# Patient Record
Sex: FEMALE | Race: WHITE | Employment: OTHER | URBAN - METROPOLITAN AREA
[De-identification: names, ages, dates, MRNs, and addresses within clinical notes are randomized per-mention and may not be internally consistent; named-entity substitution may affect disease eponyms.]

---

## 2018-09-07 RX ORDER — SODIUM CHLORIDE 0.9 % (FLUSH) 0.9 %
10 SYRINGE (ML) INJECTION PRN
Status: CANCELLED | OUTPATIENT
Start: 2018-09-07

## 2018-09-07 RX ORDER — LIDOCAINE HYDROCHLORIDE 10 MG/ML
1 INJECTION, SOLUTION EPIDURAL; INFILTRATION; INTRACAUDAL; PERINEURAL
Status: CANCELLED | OUTPATIENT
Start: 2018-09-07 | End: 2018-09-07

## 2018-09-07 RX ORDER — SODIUM CHLORIDE 0.9 % (FLUSH) 0.9 %
10 SYRINGE (ML) INJECTION EVERY 12 HOURS SCHEDULED
Status: CANCELLED | OUTPATIENT
Start: 2018-09-07

## 2018-09-07 RX ORDER — SODIUM CHLORIDE 9 MG/ML
INJECTION, SOLUTION INTRAVENOUS CONTINUOUS
Status: CANCELLED | OUTPATIENT
Start: 2018-09-07

## 2018-09-09 RX ORDER — SODIUM CHLORIDE 0.9 % (FLUSH) 0.9 %
10 SYRINGE (ML) INJECTION EVERY 12 HOURS SCHEDULED
Status: CANCELLED | OUTPATIENT
Start: 2018-09-09

## 2018-09-09 RX ORDER — SODIUM CHLORIDE 0.9 % (FLUSH) 0.9 %
10 SYRINGE (ML) INJECTION PRN
Status: CANCELLED | OUTPATIENT
Start: 2018-09-09

## 2018-09-09 RX ORDER — TETRACAINE HYDROCHLORIDE 5 MG/ML
1 SOLUTION OPHTHALMIC ONCE
Status: CANCELLED | OUTPATIENT
Start: 2018-09-09 | End: 2018-09-09

## 2018-09-10 ENCOUNTER — HOSPITAL ENCOUNTER (OUTPATIENT)
Dept: SURGERY | Age: 68
Discharge: HOME | End: 2018-09-10
Attending: OPHTHALMOLOGY | Admitting: OPHTHALMOLOGY

## 2018-09-10 VITALS
WEIGHT: 147 LBS | TEMPERATURE: 98.8 F | RESPIRATION RATE: 18 BRPM | HEART RATE: 84 BPM | BODY MASS INDEX: 25.1 KG/M2 | OXYGEN SATURATION: 96 % | DIASTOLIC BLOOD PRESSURE: 67 MMHG | HEIGHT: 64 IN | SYSTOLIC BLOOD PRESSURE: 157 MMHG

## 2018-09-10 DIAGNOSIS — H25.12 AGE-RELATED NUCLEAR CATARACT OF LEFT EYE: ICD-10-CM

## 2018-09-10 RX ORDER — ONDANSETRON 2 MG/ML
4 INJECTION INTRAMUSCULAR; INTRAVENOUS
Status: ACTIVE | OUTPATIENT
Start: 2018-09-10 | End: 2018-09-10

## 2018-09-10 RX ORDER — LABETALOL HYDROCHLORIDE 5 MG/ML
5 INJECTION, SOLUTION INTRAVENOUS EVERY 10 MIN PRN
Status: DISCONTINUED | OUTPATIENT
Start: 2018-09-10 | End: 2018-09-11 | Stop reason: HOSPADM

## 2018-09-10 RX ORDER — PROMETHAZINE HYDROCHLORIDE 25 MG/ML
6.25 INJECTION, SOLUTION INTRAMUSCULAR; INTRAVENOUS
Status: ACTIVE | OUTPATIENT
Start: 2018-09-10 | End: 2018-09-10

## 2018-09-10 RX ORDER — SODIUM CHLORIDE 0.9 % (FLUSH) 0.9 %
10 SYRINGE (ML) INJECTION PRN
Status: DISCONTINUED | OUTPATIENT
Start: 2018-09-10 | End: 2018-09-11 | Stop reason: HOSPADM

## 2018-09-10 RX ORDER — TETRACAINE HYDROCHLORIDE 5 MG/ML
1 SOLUTION OPHTHALMIC SEE ADMIN INSTRUCTIONS
Status: DISCONTINUED | OUTPATIENT
Start: 2018-09-10 | End: 2018-09-11 | Stop reason: HOSPADM

## 2018-09-10 RX ORDER — CIPROFLOXACIN HYDROCHLORIDE 3.5 MG/ML
1 SOLUTION/ DROPS TOPICAL SEE ADMIN INSTRUCTIONS
Status: DISCONTINUED | OUTPATIENT
Start: 2018-09-10 | End: 2018-09-11 | Stop reason: HOSPADM

## 2018-09-10 RX ORDER — SODIUM CHLORIDE 0.9 % (FLUSH) 0.9 %
10 SYRINGE (ML) INJECTION EVERY 12 HOURS SCHEDULED
Status: DISCONTINUED | OUTPATIENT
Start: 2018-09-10 | End: 2018-09-11 | Stop reason: HOSPADM

## 2018-09-10 RX ADMIN — TETRACAINE HYDROCHLORIDE 1 DROP: 5 SOLUTION OPHTHALMIC at 09:27

## 2018-09-10 RX ADMIN — CIPROFLOXACIN HYDROCHLORIDE 1 DROP: 3.5 SOLUTION/ DROPS TOPICAL at 09:28

## 2018-09-10 ASSESSMENT — PAIN SCALES - GENERAL
PAINLEVEL_OUTOF10: 0
PAINLEVEL_OUTOF10: 0

## 2018-09-10 ASSESSMENT — PAIN - FUNCTIONAL ASSESSMENT: PAIN_FUNCTIONAL_ASSESSMENT: 0-10

## 2018-09-10 NOTE — ANESTHESIA PRE-OP
results found for: PREGTESTUR, PREGSERUM, HCG, HCGQUANT   ABGs No results found for: PHART, PO2ART, JHY0DHR, YSV3GGE, BEART, S3JKSZCN   Type & Screen (If Applicable)  No results found for: LABABO, LABRH                         BMI: Wt Readings from Last 3 Encounters:       NPO Status:8 hours                          Anesthesia Evaluation  Patient summary reviewed no history of anesthetic complications:   Airway: Mallampati: III  TM distance: >3 FB   Neck ROM: full   Dental:          Pulmonary:Negative Pulmonary ROS and normal exam                               Cardiovascular:  Exercise tolerance: good (>4 METS),           Rhythm: regular  Rate: normal           Beta Blocker:  Not on Beta Blocker         Neuro/Psych:   Negative Neuro/Psych ROS              GI/Hepatic/Renal: Neg GI/Hepatic/Renal ROS            Endo/Other:    (+) hypothyroidism::., .                 Abdominal:           Vascular: negative vascular ROS. Anesthesia Plan      MAC     ASA 2       Induction: intravenous. Anesthetic plan and risks discussed with patient. Plan discussed with CRNA. This pre-anesthesia assessment may be used as a history and physical.    DOS STAFF ADDENDUM:    Pt seen and examined, chart reviewed (including anesthesia, drug and allergy history). No interval changes to history and physical examination. Anesthetic plan, risks, benefits, alternatives, and personnel involved discussed with patient. Patient verbalized an understanding and agrees to proceed.       Yuliet Díaz MD  September 10, 2018  9:06 AM

## 2018-09-10 NOTE — ANESTHESIA POST-OP
[facility] Department of Anesthesiology  Post-Anesthesia Note       Name:  Masoud Garcia                                  Age:  77 y.o. MRN:  [id number]     Last Vitals & Oxygen Saturation: BP (!) 157/67   Pulse 84   Temp 98.8 °F (37.1 °C) (Temporal)   Resp 18   Ht 5' 4\" (1.626 m)   Wt 147 lb (66.7 kg)   SpO2 96%   BMI 25.23 kg/m²   Patient Vitals for the past 4 hrs:   BP Temp Temp src Pulse Resp SpO2 Height Weight   09/10/18 1028 (!) 157/67 - - 84 18 96 % - -   09/10/18 1026 (!) 160/77 98.8 °F (37.1 °C) Temporal 94 16 98 % - -   09/10/18 0920 - - - 78 - - - -   09/10/18 0916 (!) 140/75 97.4 °F (36.3 °C) Temporal 78 14 96 % 5' 4\" (1.626 m) 147 lb (66.7 kg)       Level of consciousness:  Awake, alert to baseline    Respiratory: Respirations easy, no distress. Stable. Cardiovascular: Hemodynamically stable. Hydration: Adequate. PONV: Adequately managed. Post-op pain: Adequately controlled. Post-op assessment: Tolerated anesthetic well without complication. Complications:  None.     Magy Jeff MD  September 10, 2018   11:26 AM

## 2018-09-21 ENCOUNTER — ANESTHESIA EVENT (OUTPATIENT)
Dept: SURGERY | Age: 68
End: 2018-09-21
Payer: MEDICARE

## 2018-09-24 ENCOUNTER — ANESTHESIA (OUTPATIENT)
Dept: SURGERY | Age: 68
End: 2018-09-24
Payer: MEDICARE

## 2018-09-24 ENCOUNTER — HOSPITAL ENCOUNTER (OUTPATIENT)
Age: 68
Setting detail: OUTPATIENT SURGERY
Discharge: HOME | End: 2018-09-24
Attending: OPHTHALMOLOGY | Admitting: OPHTHALMOLOGY
Payer: MEDICARE

## 2018-09-24 VITALS
SYSTOLIC BLOOD PRESSURE: 149 MMHG | RESPIRATION RATE: 17 BRPM | TEMPERATURE: 98.7 F | WEIGHT: 144 LBS | HEIGHT: 63 IN | BODY MASS INDEX: 25.52 KG/M2 | HEART RATE: 77 BPM | DIASTOLIC BLOOD PRESSURE: 74 MMHG | OXYGEN SATURATION: 95 %

## 2018-09-24 VITALS — OXYGEN SATURATION: 100 % | SYSTOLIC BLOOD PRESSURE: 144 MMHG | DIASTOLIC BLOOD PRESSURE: 78 MMHG

## 2018-09-24 PROCEDURE — 3600000015 HC SURGERY LEVEL 5 ADDTL 15MIN: Performed by: OPHTHALMOLOGY

## 2018-09-24 PROCEDURE — 2709999900 HC NON-CHARGEABLE SUPPLY: Performed by: OPHTHALMOLOGY

## 2018-09-24 PROCEDURE — 6370000000 HC RX 637 (ALT 250 FOR IP): Performed by: OPHTHALMOLOGY

## 2018-09-24 PROCEDURE — 2580000003 HC RX 258: Performed by: ANESTHESIOLOGY

## 2018-09-24 PROCEDURE — 6360000002 HC RX W HCPCS: Performed by: NURSE ANESTHETIST, CERTIFIED REGISTERED

## 2018-09-24 PROCEDURE — V2632 POST CHMBR INTRAOCULAR LENS: HCPCS | Performed by: OPHTHALMOLOGY

## 2018-09-24 PROCEDURE — 7100000010 HC PHASE II RECOVERY - FIRST 15 MIN: Performed by: OPHTHALMOLOGY

## 2018-09-24 PROCEDURE — 3600000005 HC SURGERY LEVEL 5 BASE: Performed by: OPHTHALMOLOGY

## 2018-09-24 PROCEDURE — 3700000001 HC ADD 15 MINUTES (ANESTHESIA): Performed by: OPHTHALMOLOGY

## 2018-09-24 PROCEDURE — 3700000000 HC ANESTHESIA ATTENDED CARE: Performed by: OPHTHALMOLOGY

## 2018-09-24 PROCEDURE — 2500000003 HC RX 250 WO HCPCS: Performed by: OPHTHALMOLOGY

## 2018-09-24 DEVICE — ACRYSOF(R) IQ ASPHERIC IOL SP ACRYLIC FOLDABLELENS WULTRASERT(TM) DELIVERY SYSTEM UV WBLUE LIGHT FILTER. 13.0MM LENGTH 6.0MM ANTERIORASYMMETRIC BICONVEX OPTIC PLANAR HAPTICS.
Type: IMPLANTABLE DEVICE | Site: EYE | Status: FUNCTIONAL
Brand: ACRYSOF ULTRASERT

## 2018-09-24 RX ORDER — SODIUM CHLORIDE 0.9 % (FLUSH) 0.9 %
10 SYRINGE (ML) INJECTION PRN
Status: DISCONTINUED | OUTPATIENT
Start: 2018-09-24 | End: 2018-09-24 | Stop reason: HOSPADM

## 2018-09-24 RX ORDER — MIDAZOLAM HYDROCHLORIDE 1 MG/ML
INJECTION INTRAMUSCULAR; INTRAVENOUS PRN
Status: DISCONTINUED | OUTPATIENT
Start: 2018-09-24 | End: 2018-09-24 | Stop reason: SDUPTHER

## 2018-09-24 RX ORDER — SODIUM CHLORIDE 0.9 % (FLUSH) 0.9 %
10 SYRINGE (ML) INJECTION EVERY 12 HOURS SCHEDULED
Status: DISCONTINUED | OUTPATIENT
Start: 2018-09-24 | End: 2018-09-24 | Stop reason: SDUPTHER

## 2018-09-24 RX ORDER — SODIUM CHLORIDE 9 MG/ML
INJECTION, SOLUTION INTRAVENOUS CONTINUOUS
Status: DISCONTINUED | OUTPATIENT
Start: 2018-09-24 | End: 2018-09-24 | Stop reason: HOSPADM

## 2018-09-24 RX ORDER — SODIUM CHLORIDE 0.9 % (FLUSH) 0.9 %
10 SYRINGE (ML) INJECTION PRN
Status: DISCONTINUED | OUTPATIENT
Start: 2018-09-24 | End: 2018-09-24 | Stop reason: SDUPTHER

## 2018-09-24 RX ORDER — CIPROFLOXACIN HYDROCHLORIDE 3.5 MG/ML
1 SOLUTION/ DROPS TOPICAL SEE ADMIN INSTRUCTIONS
Status: DISCONTINUED | OUTPATIENT
Start: 2018-09-24 | End: 2018-09-24 | Stop reason: HOSPADM

## 2018-09-24 RX ORDER — SODIUM CHLORIDE 0.9 % (FLUSH) 0.9 %
10 SYRINGE (ML) INJECTION EVERY 12 HOURS SCHEDULED
Status: DISCONTINUED | OUTPATIENT
Start: 2018-09-24 | End: 2018-09-24 | Stop reason: HOSPADM

## 2018-09-24 RX ORDER — SODIUM CHLORIDE 9 MG/ML
INJECTION, SOLUTION INTRAVENOUS CONTINUOUS
Status: DISCONTINUED | OUTPATIENT
Start: 2018-09-24 | End: 2018-09-24 | Stop reason: SDUPTHER

## 2018-09-24 RX ORDER — LIDOCAINE HYDROCHLORIDE 10 MG/ML
INJECTION, SOLUTION EPIDURAL; INFILTRATION; INTRACAUDAL; PERINEURAL
Status: COMPLETED | OUTPATIENT
Start: 2018-09-24 | End: 2018-09-24

## 2018-09-24 RX ORDER — TETRACAINE HYDROCHLORIDE 5 MG/ML
1 SOLUTION OPHTHALMIC ONCE
Status: COMPLETED | OUTPATIENT
Start: 2018-09-24 | End: 2018-09-24

## 2018-09-24 RX ORDER — BRIMONIDINE TARTRATE 0.15 %
DROPS OPHTHALMIC (EYE)
Status: COMPLETED | OUTPATIENT
Start: 2018-09-24 | End: 2018-09-24

## 2018-09-24 RX ORDER — BALANCED SALT SOLUTION 6.4; .75; .48; .3; 3.9; 1.7 MG/ML; MG/ML; MG/ML; MG/ML; MG/ML; MG/ML
SOLUTION OPHTHALMIC
Status: COMPLETED | OUTPATIENT
Start: 2018-09-24 | End: 2018-09-24

## 2018-09-24 RX ORDER — TETRACAINE HYDROCHLORIDE 5 MG/ML
SOLUTION OPHTHALMIC
Status: COMPLETED | OUTPATIENT
Start: 2018-09-24 | End: 2018-09-24

## 2018-09-24 RX ORDER — LIDOCAINE HYDROCHLORIDE 10 MG/ML
1 INJECTION, SOLUTION EPIDURAL; INFILTRATION; INTRACAUDAL; PERINEURAL
Status: DISCONTINUED | OUTPATIENT
Start: 2018-09-24 | End: 2018-09-24 | Stop reason: CLARIF

## 2018-09-24 RX ADMIN — CIPROFLOXACIN HYDROCHLORIDE 1 DROP: 3 SOLUTION/ DROPS OPHTHALMIC at 08:30

## 2018-09-24 RX ADMIN — MIDAZOLAM 2 MG: 1 INJECTION INTRAMUSCULAR; INTRAVENOUS at 09:33

## 2018-09-24 RX ADMIN — Medication 0.5 ML: at 08:37

## 2018-09-24 RX ADMIN — Medication 0.5 ML: at 08:31

## 2018-09-24 RX ADMIN — TETRACAINE HYDROCHLORIDE 1 DROP: 5 SOLUTION OPHTHALMIC at 08:37

## 2018-09-24 RX ADMIN — SODIUM CHLORIDE: 0.9 INJECTION, SOLUTION INTRAVENOUS at 08:37

## 2018-09-24 RX ADMIN — CIPROFLOXACIN HYDROCHLORIDE 1 DROP: 3 SOLUTION/ DROPS OPHTHALMIC at 08:37

## 2018-09-24 RX ADMIN — LIDOCAINE HYDROCHLORIDE: 35 GEL OPHTHALMIC at 08:37

## 2018-09-24 RX ADMIN — POVIDONE-IODINE: 5 SOLUTION OPHTHALMIC at 08:37

## 2018-09-24 ASSESSMENT — PAIN SCALES - GENERAL
PAINLEVEL_OUTOF10: 0
PAINLEVEL_OUTOF10: 0

## 2018-09-24 ASSESSMENT — PAIN - FUNCTIONAL ASSESSMENT: PAIN_FUNCTIONAL_ASSESSMENT: 0-10

## 2018-09-24 NOTE — OP NOTE
[address] [place] [address]  [phone]      9/24/2018    Patient name: Javon Rosario  YOB: 1950  MRN: [id number]    [place]iges: No Known Allergies    Pre-operative diagnosis:  Cataract Right Eye    Post-operative diagnosis:  Same    Procedure Performed:  Phacoemulsification with insertion of a foldable posterior chamber intraocular lens implant to the right eye. Anesthesia:  Topical Anesthesia with MAC. Estimated Blood Loss: None    Specimens removed: None    Limbal Relaxing Incisions:  Axis:N/A    Arc Length: N/A    Complications:  None    Description of Procedure: In the same day surgery center, the patient was given the usual pre-operative regimen. In the operating room suite, the right eye was prepped and draped in the usual sterile fashion. A paracentesis tract was fashioned, after which 0.5 MI of 1% preservative free Lidocaine was injected into the anterior chamber followed by Viscoat for a complete chamber fill. A clear cornea temporal tunnel was created using a keratome. Under optimal magnification and visualization, a continuous curvilinear capsulorrhexis was performed. Hydro-dissection of the lens was carried out using balanced salt solution. The lens was then phacoemulsified using the divide and conquer technique with a total CDE of 7.68. Residual cortex was removed using the I/A handpiece followed by thorough posterior capsule polishing. The capsular bag was then filled with Provisc and the lens was gently delivered into the bag, achieving excellent centration. Triamcinolone/Moxifloxacin was not injected 3.5mm posterior to the limbus. Provisc was removed using the I/A handpiece and the globe was pressurized using balanced salt solution. The paracentesis tract and the temporal wound were both checked and found to be watertight. The speculum was removed and Betadine 5% was instilled.  The patient tolerated the procedure

## 2018-09-24 NOTE — H&P
Date of Surgery Update:  Francis Thurman was seen, history and physical examination reviewed, and patient examined by me today.  There have been no significant clinical changes since the completion of the previous history and physical.    Electronically signed by: Antoinette Cifuentes MD,9/24/2018,9:43 AM

## 2018-09-24 NOTE — ADDENDUM NOTE
Addendum  created 09/24/18 1353 by BETINA Davila CRNA    Anesthesia Event edited, Anesthesia Intra Flowsheets edited, Anesthesia Intra Meds edited

## 2021-04-21 ENCOUNTER — APPOINTMENT (OUTPATIENT)
Dept: GENERAL RADIOLOGY | Age: 71
DRG: 149 | End: 2021-04-21
Payer: MEDICARE

## 2021-04-21 ENCOUNTER — APPOINTMENT (OUTPATIENT)
Dept: CT IMAGING | Age: 71
DRG: 149 | End: 2021-04-21
Payer: MEDICARE

## 2021-04-21 ENCOUNTER — HOSPITAL ENCOUNTER (INPATIENT)
Age: 71
LOS: 2 days | Discharge: HOME | DRG: 149 | End: 2021-04-24
Attending: EMERGENCY MEDICINE | Admitting: STUDENT IN AN ORGANIZED HEALTH CARE EDUCATION/TRAINING PROGRAM
Payer: MEDICARE

## 2021-04-21 DIAGNOSIS — R42 ACUTE SEVERE VERTIGO: Primary | ICD-10-CM

## 2021-04-21 PROCEDURE — 99282 EMERGENCY DEPT VISIT SF MDM: CPT

## 2021-04-21 PROCEDURE — 81001 URINALYSIS AUTO W/SCOPE: CPT

## 2021-04-21 PROCEDURE — 70496 CT ANGIOGRAPHY HEAD: CPT

## 2021-04-21 PROCEDURE — 93005 ELECTROCARDIOGRAM TRACING: CPT | Performed by: EMERGENCY MEDICINE

## 2021-04-21 PROCEDURE — 6360000004 HC RX CONTRAST MEDICATION: Performed by: EMERGENCY MEDICINE

## 2021-04-21 PROCEDURE — 71045 X-RAY EXAM CHEST 1 VIEW: CPT

## 2021-04-21 PROCEDURE — 70450 CT HEAD/BRAIN W/O DYE: CPT

## 2021-04-21 RX ORDER — SODIUM CHLORIDE 9 MG/ML
1000 INJECTION, SOLUTION INTRAVENOUS CONTINUOUS
Status: DISCONTINUED | OUTPATIENT
Start: 2021-04-21 | End: 2021-04-24 | Stop reason: HOSPADM

## 2021-04-21 RX ORDER — ONDANSETRON 2 MG/ML
4 INJECTION INTRAMUSCULAR; INTRAVENOUS ONCE
Status: COMPLETED | OUTPATIENT
Start: 2021-04-21 | End: 2021-04-22

## 2021-04-21 RX ADMIN — IOPAMIDOL 75 ML: 755 INJECTION, SOLUTION INTRAVENOUS at 23:28

## 2021-04-22 ENCOUNTER — APPOINTMENT (OUTPATIENT)
Dept: MRI IMAGING | Age: 71
DRG: 149 | End: 2021-04-22
Payer: MEDICARE

## 2021-04-22 PROBLEM — R42 DIZZINESS: Status: ACTIVE | Noted: 2021-04-22

## 2021-04-22 LAB
A/G RATIO: 1.4 (ref 1.1–2.2)
ALBUMIN SERPL-MCNC: 4 G/DL (ref 3.4–5)
ALP BLD-CCNC: 84 U/L (ref 40–129)
ALT SERPL-CCNC: 8 U/L (ref 10–40)
ANION GAP SERPL CALCULATED.3IONS-SCNC: 11 MMOL/L (ref 3–16)
AST SERPL-CCNC: 18 U/L (ref 15–37)
BASOPHILS ABSOLUTE: 0.1 K/UL (ref 0–0.2)
BASOPHILS RELATIVE PERCENT: 1.1 %
BILIRUB SERPL-MCNC: 0.3 MG/DL (ref 0–1)
BILIRUBIN URINE: NEGATIVE
BLOOD, URINE: NEGATIVE
BUN BLDV-MCNC: 11 MG/DL (ref 7–20)
CALCIUM SERPL-MCNC: 10 MG/DL (ref 8.3–10.6)
CHLORIDE BLD-SCNC: 101 MMOL/L (ref 99–110)
CHOLESTEROL, TOTAL: 231 MG/DL (ref 0–199)
CLARITY: ABNORMAL
CO2: 25 MMOL/L (ref 21–32)
COLOR: YELLOW
CREAT SERPL-MCNC: 0.6 MG/DL (ref 0.6–1.2)
EKG ATRIAL RATE: 73 BPM
EKG DIAGNOSIS: NORMAL
EKG P AXIS: 66 DEGREES
EKG P-R INTERVAL: 182 MS
EKG Q-T INTERVAL: 354 MS
EKG QRS DURATION: 84 MS
EKG QTC CALCULATION (BAZETT): 389 MS
EKG R AXIS: 51 DEGREES
EKG T AXIS: 38 DEGREES
EKG VENTRICULAR RATE: 73 BPM
EOSINOPHILS ABSOLUTE: 0.1 K/UL (ref 0–0.6)
EOSINOPHILS RELATIVE PERCENT: 1.8 %
EPITHELIAL CELLS, UA: 0 /HPF (ref 0–5)
GFR AFRICAN AMERICAN: >60
GFR NON-AFRICAN AMERICAN: >60
GLOBULIN: 2.9 G/DL
GLUCOSE BLD-MCNC: 121 MG/DL (ref 70–99)
GLUCOSE URINE: NEGATIVE MG/DL
HCT VFR BLD CALC: 36.3 % (ref 36–48)
HDLC SERPL-MCNC: 61 MG/DL (ref 40–60)
HEMOGLOBIN: 12.3 G/DL (ref 12–16)
HYALINE CASTS: 0 /LPF (ref 0–8)
INR BLD: 1 (ref 0.86–1.14)
KETONES, URINE: NEGATIVE MG/DL
LDL CHOLESTEROL CALCULATED: 156 MG/DL
LEUKOCYTE ESTERASE, URINE: NEGATIVE
LV EF: 58 %
LVEF MODALITY: NORMAL
LYMPHOCYTES ABSOLUTE: 2 K/UL (ref 1–5.1)
LYMPHOCYTES RELATIVE PERCENT: 28.6 %
MCH RBC QN AUTO: 31.5 PG (ref 26–34)
MCHC RBC AUTO-ENTMCNC: 33.8 G/DL (ref 31–36)
MCV RBC AUTO: 93.2 FL (ref 80–100)
MICROSCOPIC EXAMINATION: YES
MONOCYTES ABSOLUTE: 0.2 K/UL (ref 0–1.3)
MONOCYTES RELATIVE PERCENT: 2.6 %
NEUTROPHILS ABSOLUTE: 4.7 K/UL (ref 1.7–7.7)
NEUTROPHILS RELATIVE PERCENT: 65.9 %
NITRITE, URINE: NEGATIVE
PDW BLD-RTO: 13.7 % (ref 12.4–15.4)
PH UA: 8.5 (ref 5–8)
PLATELET # BLD: 285 K/UL (ref 135–450)
PMV BLD AUTO: 7.9 FL (ref 5–10.5)
POTASSIUM REFLEX MAGNESIUM: 4.1 MMOL/L (ref 3.5–5.1)
PROTEIN UA: NEGATIVE MG/DL
PROTHROMBIN TIME: 11.6 SEC (ref 10–13.2)
RBC # BLD: 3.9 M/UL (ref 4–5.2)
RBC UA: 1 /HPF (ref 0–4)
SODIUM BLD-SCNC: 137 MMOL/L (ref 136–145)
SPECIFIC GRAVITY UA: 1.03 (ref 1–1.03)
TOTAL PROTEIN: 6.9 G/DL (ref 6.4–8.2)
TRIGL SERPL-MCNC: 72 MG/DL (ref 0–150)
TROPONIN: <0.01 NG/ML
URINE REFLEX TO CULTURE: ABNORMAL
URINE TYPE: ABNORMAL
UROBILINOGEN, URINE: 0.2 E.U./DL
VLDLC SERPL CALC-MCNC: 14 MG/DL
WBC # BLD: 7.1 K/UL (ref 4–11)
WBC UA: 1 /HPF (ref 0–5)

## 2021-04-22 PROCEDURE — 70551 MRI BRAIN STEM W/O DYE: CPT

## 2021-04-22 PROCEDURE — 93306 TTE W/DOPPLER COMPLETE: CPT

## 2021-04-22 PROCEDURE — 97530 THERAPEUTIC ACTIVITIES: CPT

## 2021-04-22 PROCEDURE — 80061 LIPID PANEL: CPT

## 2021-04-22 PROCEDURE — 2580000003 HC RX 258: Performed by: EMERGENCY MEDICINE

## 2021-04-22 PROCEDURE — 6360000002 HC RX W HCPCS: Performed by: NURSE PRACTITIONER

## 2021-04-22 PROCEDURE — 84484 ASSAY OF TROPONIN QUANT: CPT

## 2021-04-22 PROCEDURE — 2580000003 HC RX 258: Performed by: NURSE PRACTITIONER

## 2021-04-22 PROCEDURE — 96374 THER/PROPH/DIAG INJ IV PUSH: CPT

## 2021-04-22 PROCEDURE — 97162 PT EVAL MOD COMPLEX 30 MIN: CPT

## 2021-04-22 PROCEDURE — 93010 ELECTROCARDIOGRAM REPORT: CPT | Performed by: INTERNAL MEDICINE

## 2021-04-22 PROCEDURE — 97165 OT EVAL LOW COMPLEX 30 MIN: CPT

## 2021-04-22 PROCEDURE — 6370000000 HC RX 637 (ALT 250 FOR IP): Performed by: NURSE PRACTITIONER

## 2021-04-22 PROCEDURE — 92610 EVALUATE SWALLOWING FUNCTION: CPT

## 2021-04-22 PROCEDURE — 80053 COMPREHEN METABOLIC PANEL: CPT

## 2021-04-22 PROCEDURE — 85610 PROTHROMBIN TIME: CPT

## 2021-04-22 PROCEDURE — 2060000000 HC ICU INTERMEDIATE R&B

## 2021-04-22 PROCEDURE — 6360000002 HC RX W HCPCS: Performed by: EMERGENCY MEDICINE

## 2021-04-22 PROCEDURE — 94760 N-INVAS EAR/PLS OXIMETRY 1: CPT

## 2021-04-22 PROCEDURE — 83036 HEMOGLOBIN GLYCOSYLATED A1C: CPT

## 2021-04-22 PROCEDURE — 85025 COMPLETE CBC W/AUTO DIFF WBC: CPT

## 2021-04-22 PROCEDURE — 36415 COLL VENOUS BLD VENIPUNCTURE: CPT

## 2021-04-22 RX ORDER — MECLIZINE HCL 12.5 MG/1
12.5 TABLET ORAL 3 TIMES DAILY PRN
Status: DISCONTINUED | OUTPATIENT
Start: 2021-04-22 | End: 2021-04-24 | Stop reason: HOSPADM

## 2021-04-22 RX ORDER — SODIUM CHLORIDE 0.9 % (FLUSH) 0.9 %
5-40 SYRINGE (ML) INJECTION EVERY 12 HOURS SCHEDULED
Status: DISCONTINUED | OUTPATIENT
Start: 2021-04-22 | End: 2021-04-24 | Stop reason: HOSPADM

## 2021-04-22 RX ORDER — DIAZEPAM 5 MG/1
5 TABLET ORAL EVERY 12 HOURS PRN
Status: DISCONTINUED | OUTPATIENT
Start: 2021-04-22 | End: 2021-04-22

## 2021-04-22 RX ORDER — OXYCODONE AND ACETAMINOPHEN 10; 325 MG/1; MG/1
1 TABLET ORAL EVERY 4 HOURS PRN
Status: DISCONTINUED | OUTPATIENT
Start: 2021-04-22 | End: 2021-04-22

## 2021-04-22 RX ORDER — CALCIUM CARBONATE 500(1250)
500 TABLET ORAL DAILY
Status: DISCONTINUED | OUTPATIENT
Start: 2021-04-22 | End: 2021-04-24 | Stop reason: HOSPADM

## 2021-04-22 RX ORDER — OXYCODONE HYDROCHLORIDE 5 MG/1
5 TABLET ORAL EVERY 8 HOURS PRN
COMMUNITY

## 2021-04-22 RX ORDER — DIAZEPAM 5 MG/1
5 TABLET ORAL ONCE
Status: COMPLETED | OUTPATIENT
Start: 2021-04-22 | End: 2021-04-22

## 2021-04-22 RX ORDER — MULTIVITAMIN WITH IRON
1 TABLET ORAL DAILY
Status: DISCONTINUED | OUTPATIENT
Start: 2021-04-22 | End: 2021-04-24 | Stop reason: HOSPADM

## 2021-04-22 RX ORDER — ROSUVASTATIN CALCIUM 40 MG/1
40 TABLET, COATED ORAL NIGHTLY
Status: DISCONTINUED | OUTPATIENT
Start: 2021-04-22 | End: 2021-04-24 | Stop reason: HOSPADM

## 2021-04-22 RX ORDER — ONDANSETRON 2 MG/ML
4 INJECTION INTRAMUSCULAR; INTRAVENOUS EVERY 6 HOURS PRN
Status: DISCONTINUED | OUTPATIENT
Start: 2021-04-22 | End: 2021-04-24 | Stop reason: HOSPADM

## 2021-04-22 RX ORDER — POLYETHYLENE GLYCOL 3350 17 G/17G
17 POWDER, FOR SOLUTION ORAL DAILY PRN
Status: DISCONTINUED | OUTPATIENT
Start: 2021-04-22 | End: 2021-04-24 | Stop reason: HOSPADM

## 2021-04-22 RX ORDER — ASPIRIN 300 MG/1
300 SUPPOSITORY RECTAL DAILY
Status: DISCONTINUED | OUTPATIENT
Start: 2021-04-22 | End: 2021-04-24 | Stop reason: HOSPADM

## 2021-04-22 RX ORDER — ASPIRIN 81 MG/1
81 TABLET ORAL DAILY
Status: DISCONTINUED | OUTPATIENT
Start: 2021-04-22 | End: 2021-04-24 | Stop reason: HOSPADM

## 2021-04-22 RX ORDER — PROMETHAZINE HYDROCHLORIDE 25 MG/1
12.5 TABLET ORAL EVERY 6 HOURS PRN
Status: DISCONTINUED | OUTPATIENT
Start: 2021-04-22 | End: 2021-04-24 | Stop reason: HOSPADM

## 2021-04-22 RX ORDER — ACETAMINOPHEN 325 MG/1
650 TABLET ORAL EVERY 6 HOURS PRN
Status: DISCONTINUED | OUTPATIENT
Start: 2021-04-22 | End: 2021-04-24 | Stop reason: HOSPADM

## 2021-04-22 RX ORDER — LEVOTHYROXINE SODIUM 112 UG/1
112 TABLET ORAL DAILY
Status: DISCONTINUED | OUTPATIENT
Start: 2021-04-22 | End: 2021-04-24 | Stop reason: HOSPADM

## 2021-04-22 RX ORDER — FOLIC ACID 1 MG/1
1 TABLET ORAL DAILY
Status: DISCONTINUED | OUTPATIENT
Start: 2021-04-22 | End: 2021-04-24 | Stop reason: HOSPADM

## 2021-04-22 RX ORDER — DIAZEPAM 5 MG/1
5 TABLET ORAL EVERY 12 HOURS PRN
Status: DISCONTINUED | OUTPATIENT
Start: 2021-04-22 | End: 2021-04-24 | Stop reason: HOSPADM

## 2021-04-22 RX ORDER — SODIUM CHLORIDE 9 MG/ML
25 INJECTION, SOLUTION INTRAVENOUS PRN
Status: DISCONTINUED | OUTPATIENT
Start: 2021-04-22 | End: 2021-04-24 | Stop reason: HOSPADM

## 2021-04-22 RX ORDER — SODIUM CHLORIDE 0.9 % (FLUSH) 0.9 %
5-40 SYRINGE (ML) INJECTION PRN
Status: DISCONTINUED | OUTPATIENT
Start: 2021-04-22 | End: 2021-04-24 | Stop reason: HOSPADM

## 2021-04-22 RX ORDER — UBIDECARENONE 75 MG
50 CAPSULE ORAL DAILY
Status: DISCONTINUED | OUTPATIENT
Start: 2021-04-22 | End: 2021-04-24 | Stop reason: HOSPADM

## 2021-04-22 RX ADMIN — ONDANSETRON 4 MG: 2 INJECTION INTRAMUSCULAR; INTRAVENOUS at 00:07

## 2021-04-22 RX ADMIN — SODIUM CHLORIDE 1000 ML: 9 INJECTION, SOLUTION INTRAVENOUS at 00:06

## 2021-04-22 RX ADMIN — ENOXAPARIN SODIUM 40 MG: 40 INJECTION SUBCUTANEOUS at 12:24

## 2021-04-22 RX ADMIN — SODIUM CHLORIDE, PRESERVATIVE FREE 10 ML: 5 INJECTION INTRAVENOUS at 20:42

## 2021-04-22 RX ADMIN — ASPIRIN 81 MG: 81 TABLET, COATED ORAL at 12:25

## 2021-04-22 RX ADMIN — CALCIUM 500 MG: 500 TABLET ORAL at 12:25

## 2021-04-22 RX ADMIN — MECLIZINE 12.5 MG: 12.5 TABLET ORAL at 03:48

## 2021-04-22 RX ADMIN — Medication 50 MCG: at 12:25

## 2021-04-22 RX ADMIN — DIAZEPAM 5 MG: 5 TABLET ORAL at 03:50

## 2021-04-22 RX ADMIN — FOLIC ACID 1 MG: 1 TABLET ORAL at 12:25

## 2021-04-22 RX ADMIN — LEVOTHYROXINE SODIUM 112 MCG: 0.11 TABLET ORAL at 06:30

## 2021-04-22 RX ADMIN — SODIUM CHLORIDE, PRESERVATIVE FREE 10 ML: 5 INJECTION INTRAVENOUS at 12:26

## 2021-04-22 RX ADMIN — ROSUVASTATIN CALCIUM 40 MG: 40 TABLET, FILM COATED ORAL at 20:41

## 2021-04-22 RX ADMIN — THERA TABS 1 TABLET: TAB at 12:25

## 2021-04-22 RX ADMIN — ACETAMINOPHEN 650 MG: 325 TABLET ORAL at 20:41

## 2021-04-22 ASSESSMENT — PAIN SCALES - GENERAL
PAINLEVEL_OUTOF10: 0

## 2021-04-22 ASSESSMENT — PAIN DESCRIPTION - ORIENTATION: ORIENTATION: OTHER (COMMENT)

## 2021-04-22 ASSESSMENT — PAIN DESCRIPTION - ONSET: ONSET: ON-GOING

## 2021-04-22 NOTE — PROGRESS NOTES
4 Eyes Skin Assessment     NAME:  Kayy Alexis  YOB: 1950  MEDICAL RECORD NUMBER:  [id number]    The patient is being assess for  Admission    I agree that 2 RN's have performed a thorough Head to Toe Skin Assessment on the patient. ALL assessment sites listed below have been assessed. Areas assessed by both nurses:    Head, Face, Ears, Shoulders, Back, Chest, Arms, Elbows, Hands, Sacrum. Buttock, Coccyx, Ischium and Legs. Feet and Heels        Does the Patient have a Wound?  No noted wound(s)       Lakhwinder Prevention initiated:  No   Wound Care Orders initiated:  No    Pressure Injury (Stage 3,4, Unstageable, DTI, NWPT, and Complex wounds) if present place consult order under [de-identified] No    New and Established Ostomies if present place consult order under : No      Nurse 1 eSignature: Electronically signed by Andrade Sprague RN on 4/22/21 at 3:38 AM EDT    **SHARE this note so that the co-signing nurse is able to place an eSignature**    Nurse 2 eSignature: Electronically signed by Diana Álvarez RN on 4/22/21 at 5:28 AM EDT

## 2021-04-22 NOTE — PROGRESS NOTES
it is recommended that the patient have 2-3 sessions per week of Physical Therapy at d/c to increase the patient's independence. At this time, this patient demonstrates the endurance and safety to discharge home with home PT level 1 and a follow up treatment frequency of 2-3x/wk. Please see assessment section for further patient specific details. If patient discharges prior to next session this note will serve as a discharge summary. Please see below for the latest assessment towards goals. Specific instructions for Next Treatment: Address vestibular dysfuction; Practice ambulation and stair climb  Prognosis: Good  Decision Making: Medium Complexity  History: See below  Exam: Strength; ROM; Balance; Ambulation; Vestibular testing  Clinical Presentation: Evolving  PT Education: Goals; General Safety;Gait Training;PT Role;Orientation;Plan of Care;Precautions;Transfer Training;Disease Specific Education;Equipment; Functional Mobility Training  Barriers to Learning: Dizziness; Vertigo; Pain  REQUIRES PT FOLLOW UP: Yes  Activity Tolerance  Activity Tolerance: Treatment limited secondary to medical complications (free text)  Activity Tolerance: Dizziness/vertigo       Patient Diagnosis(es): The encounter diagnosis was Acute severe vertigo. has a past medical history of Neck pain, Rheumatic carditis, Rheumatic fever, and Thyroid disease. has a past surgical history that includes Neck surgery; parathyroidectomy; Thyroidectomy; Rotator cuff repair (Bilateral); Appendectomy; Colonoscopy; Hysterectomy; Cataract removal with implant (Left, 09/10/2018); Cataract removal with implant (Right, 09/24/2018); and pr xcapsl ctrc rmvl insj io lens prosth w/o ecp (Right, 9/24/2018).     Restrictions  Restrictions/Precautions  Restrictions/Precautions: Fall Risk  Position Activity Restriction  Other position/activity restrictions: Dizziness/vertigo     Vision/Hearing  Vision: Impaired  Vision Exceptions: Wears glasses for reading  Hearing: (C/o intermittent ringing, and sound of \"crickets\" in her R ear.)       Subjective  General  Chart Reviewed: Yes  Patient assessed for rehabilitation services?: Yes  Additional Pertinent Hx: Per Liz Gallagher DO, Loreen Harari Danna Goldsmith is a 66 y.o. female who presents to the emergency department with a complaint of severe vertigo. The patient states that she was lying in bed and turned over today at approximately 4 PM and has had severe vertigo since that time. She states that she is not able to open her eyes. Any type of movement makes it worse. She has been nauseated but denies any vomiting. She does report a mild generalized headache. She does have chronic neck pain which is unchanged from baseline. She is having some difficulty walking and states that \"she could not hardly walk\" and required assistance of her  to get to the hospital.  She normally walks without assistance. She does not take any anticoagulants. She denies any vision loss or vision change, facial droop, difficulty speaking, focal weakness or numbness in the extremities. \"  Response To Previous Treatment: Not applicable  Referring Practitioner: BETINA Tsang CNP  Referral Date : 04/22/21  Diagnosis: Dizziness; Vertigo  Follows Commands: Within Functional Limits  Subjective  Subjective: Pt is pleasant and agreeable to evaluation. Report less dizziness than yesterday. Received medication for dizziness at some point last night.   Pain Screening  Patient Currently in Pain: Denies    Orientation  Orientation  Overall Orientation Status: Within Normal Limits     Social/Functional History  Social/Functional History  Lives With: Spouse  Type of Home: (condo)  Home Layout: One level(2nd floor condo)  Home Access: Elevator, Level entry  Bathroom Shower/Tub: Tub/Shower unit, Walk-in shower(uses walk in shower)  Bathroom Toilet: Handicap height  Bathroom Equipment: Grab bars in shower, Shower chair  Home Equipment: Rolling Strength RUE: WNL  Comment: Shoulder Flex, ABD, Elbow Flex/Ext WNL  Strength LUE  Strength LUE: WFL  Comment: Shoulder Flex, ABD grossly 4/5 (painful), Elbow Flex/Ext 4/5 (painful). Tone RLE  RLE Tone: Normotonic  Tone LLE  LLE Tone: Normotonic  Motor Control  Gross Motor?: WFL     Bed mobility  Supine to Sit: Supervision(Dizzy with movement)  Sit to Supine: Supervision(Dizzy with movement)     Transfers  Sit to Stand: Stand by assistance  Stand to sit: Stand by assistance     Ambulation  Ambulation?: Yes  Ambulation 1  Device: No Device  Assistance: Contact guard assistance;Stand by assistance  Quality of Gait: Slow speed, minimal head motion (turning L/R, or up/down), step-through pattern, mild sway, no overt LOB. Distance: 48', 200'    Plan   Plan  Times per week: 2-3x  Specific instructions for Next Treatment: Address vestibular dysfuction; Practice ambulation and stair climb  Current Treatment Recommendations: Strengthening, Balance Training, Endurance Training, Functional Mobility Training, Transfer Training, Gait Training, Stair training, Positioning, Equipment Evaluation, Education, & procurement, Patient/Caregiver Education & Training, Safety Education & Training, Home Exercise Program, Neuromuscular Re-education  Safety Devices  Type of devices: All fall risk precautions in place, Nurse notified(Care transferred to [facility] at end of session)  Restraints  Initially in place: No    AM-PAC Score  AM-PAC Inpatient Mobility Raw Score : 18 (04/22/21 0942)  AM-PAC Inpatient T-Scale Score : 43.63 (04/22/21 0942)  Mobility Inpatient CMS 0-100% Score: 46.58 (04/22/21 0942)  Mobility Inpatient CMS G-Code Modifier : CK (04/22/21 6753)          Goals  Short term goals  Time Frame for Short term goals:  In 2-3 days pt will perform  Short term goal 1: Bed mobility (I)  Short term goal 2: Transfers (I)  Short term goal 3: Ambulation 150', Mod I/(I) with LRAD  Short term goal 4: Ascend/Descend 12 steps with SBA Long term goals  Time Frame for Long term goals : LTG = STG  Patient Goals   Patient goals : To get rid of her vertigo       Therapy Time   Individual Concurrent Group Co-treatment   Time In 0850         Time Out 0935         Minutes 45         Timed Code Treatment Minutes: 30 Minutes   Evaluation time: 15 min.      Clover Baez PT    Electronically signed by Clover Baez, FRANCISCA [id number] on 4/22/2021 at 9:44 AM

## 2021-04-22 NOTE — ED NOTES
Report called to MAYRA Daugherty on 5N. Denies further questions.      Jaison Weeks RN  04/22/21 5188

## 2021-04-22 NOTE — PROGRESS NOTES
Progress Note  Admit Date: 4/21/2021      PCP: Macho Diego     CC: F/U for dizziness    Days in hospital:  0    SUBJECTIVE / Interval History:  His dizziness has improved but still has some vertigo  tired        Allergies  Patient has no known allergies. Medications    Scheduled Meds:   calcium elemental  500 mg Oral Daily    folic acid  1 mg Oral Daily    levothyroxine  112 mcg Oral Daily    multivitamin  1 tablet Oral Daily    vitamin B-12  50 mcg Oral Daily    sodium chloride flush  5-40 mL Intravenous 2 times per day    enoxaparin  40 mg Subcutaneous Daily    aspirin  81 mg Oral Daily    Or    aspirin  300 mg Rectal Daily    rosuvastatin  40 mg Oral Nightly     Continuous Infusions:   sodium chloride      sodium chloride 1,000 mL (04/22/21 0006)       PRN Meds:  sodium chloride flush, sodium chloride, promethazine **OR** ondansetron, polyethylene glycol, perflutren lipid microspheres, diazePAM, meclizine    Vitals    BP (!) 147/74   Pulse 75   Temp 97.6 °F (36.4 °C) (Oral)   Resp 16   Ht 5' 2.5\" (1.588 m)   Wt 158 lb 8.2 oz (71.9 kg)   SpO2 94%   BMI 28.53 kg/m²     Exam:    Gen: No distress. Eyes: PERRL. No sclera icterus. Mild nystagmus  ENT: No discharge. Pharynx clear. External appearance of ears and nose normal.  Neck: Trachea midline. No obvious mass. Resp: No accessory muscle use. No crackles. No wheezes. No rhonchi. No dullness on percussion. CV: Regular rate. Regular rhythm. No murmur or rub. No edema. GI: Non-tender. Non-distended. No hernia. Skin: Warm, dry, normal texture and turgor. No nodule on exposed extremities. Lymph: No cervical LAD. No supraclavicular LAD. M/S: No cyanosis. No clubbing. No joint deformity. Neuro: Moves all four extremities. CN 2-12 tested, no defect noted. Psych: Oriented x 3. No anxiety. Awake. Alert. Intact judgement and insight.     Data    LABS  CBC:   Recent Labs     04/22/21  0003   WBC 7.1   HGB 12.3   HCT 36.3   MCV 93.2 secondary to   encroachment by atherosclerotic plaque. 3. Distal right internal carotid artery cervical segment irregular contour   suggesting association with fibromuscular dysplasia (FMD). CT HEAD WO CONTRAST   Final Result   Moderate chronic microischemic disease scattered in the deep white matter   with no acute intracranial abnormality seen. The findings were called to and Dr. Paras Silva at 11:30 p.m. MRI brain without contrast    (Results Pending)       CONSULTS:    [facility] [place] TO CHANGE BASE FLUIDS  IP CONSULT TO NEUROLOGY    ASSESSMENT AND PLAN:      Active Problems:    Dizziness  Resolved Problems:    * No resolved hospital problems. *    Patient is a 75-year-old female with a past medical history of rheumatic heart disease, hypothyroidism, rheumatic fever who presented with sudden onset of dizziness and vertigo. CTA of the head and neck showed some bilateral internal carotid stenosis about 50 to 70% with left subclavian stenosis. There also was calcified left vertebral artery hence the stroke team recommended that the patient get evaluated for central causes of vertigo    Vertigo-seems more peripheral  -[place]-Hallpike positive and patient does have some nystagmus  -MRI ordered  -On aspirin statin  Neurology consulted in the ED  -Started on empiric Antivert and Valium  -PT OT ordered    Atherosclerosis bilateral carotid arteries with left subclavian stenosis  -On aspirin and statin    Chronic pain  -Resume home Percocet    Hypothyroidism  -Continue levothyroxine        Diet: Diet NPO Effective Now  Code Status: Full Code        Discharge plan -admitted overnight. Monitor today if okay can be discharged tomorrow    The patient and / or the family were informed of the results of any tests, a time was given to answer questions, a plan was proposed and they agreed with plan.     Discussed with consulting physicians, nursing and social work     The note was completed using

## 2021-04-22 NOTE — PROGRESS NOTES
Pharmacy Medication Reconciliation Note     List of medications patient is currently taking is complete. Source of information:   1. OAS  2. [address]  3. Patient interview    Notes regarding home medications:   1. Patient states not taking Amitriptylline - did not help her  2. Removed Percocet 10325  3.  Added Oxycodone 5mg     Denies taking any other OTC or herbal medications    Jose Raul Rodriguez, [place] 4/22/2021 2:25 PM

## 2021-04-22 NOTE — PROGRESS NOTES
Speech Language Pathology    Attempted to see pt for initial assessment. Currently out of room for ECHO. Will reattempt later today as schedule permits.     Leatha Partida MS, CCC-SLP #[id number]  Speech Language Pathologist

## 2021-04-22 NOTE — CONSULTS
Stroke Team Consult Note    Patient Name:  Lilly Pritchett  :  1950    Date of Encounter:  2021  Stroke Team Paged:  23:23 PM    Patient is a 65 yo F with PMHx of HTN, lumbar stenosis, and hypothyroidism (post-surgical with hx of hyperparathyroidism, Graves disease documented) presents to ED with onset of vertigo and headache. LKN 16:00 PM.    No alteplase due to LKN > 4.5 h ago. No thrombectomy due to no LVO. CT head w/o contrast with no acute intracranial hemorrhage, no hyperdense vessel, no early ischemic changes noted on my review. Calcified L vertebral artery appreciated on imaging. CTA head/neck with bilateral vertebral arteries patent, bilateral AICAs, SCAs, PCAs visualized. Some difficulty seeing origin of R PICA, L PICA visualized. Radiology report pending. A/P:  Patient does have notable atherosclerosis in vertebral artery (L > R). Would consider peripheral vertigo work up as well. Given headache with symptoms, would also get more hx regarding migraines and whether patient has a history of complicated migraine. Would not necessarily expect vestibular migraine onset at age 66 in a patient without significant migraine hx. Patients with posterior stroke can also have headache so it is certainly not ruled out though there are no options for acute intervention from a stroke standpoint in this patient.     -Given difficulty visualizing R PICA, fair to consider MRI Brain w/o contrast   -I will follow up radiology read as well  -Consider [place]-Hallpike to evaluate for possible peripheral causes of vertigo.  -Consider consult to neurology service for central vs peripheral vertigo work up if MRI Brain w/o contrast is negative for acute infarct.  -If MRI Brain is positive for acute infarct, patient should have normal stroke work up   -TTE, telemetry while inpatient and consider ZIYAD outpatient    -Lipid panel, statin for LDL > 70   -Hgb A1c, goal < 7.0%   -Continue home ASA 81 mg qd > may meet criteria for POINT protocol (DAPT x 21 days) if stroke on MRI. Please call for questions or concerns related to acute stroke management.     Mendel Pack, MD  PGY5, Vascular Neurology  Attending:  Dr. Giuseppe Pike

## 2021-04-22 NOTE — PROGRESS NOTES
NAME:  Preeti Merritt  YOB: 1950  MEDICAL RECORD NUMBER:  [id number]  TODAYS DATE:  4/22/2021    Discussed personal risk factors for Stroke /TIA with patient/family, and ways to reduce the risk for a recurrent stroke. Patient's personal risk factors which were identified are:     [] Alcohol Abuse: check with your physician before any alcohol consumption. [] Atrial fibrillation: may cause blood clots. [] Drug Abuse: Seek help, talk with your doctor  [] Clotting Disorder  [] Diabetes  [x] Family history of stroke or heart disease  [x] High Blood Pressure/Hypertension: work with your physician. [x] High cholesterol: monitor cholesterol levels with your physician.   [] Overweight/Obesity: work with your physician for your ideal body weight. [x] Physical Inactivity: get regular exercise as directed by your physician. [] Personal history of previous TIA or stroke  [x] Poor Diet; decrease salt (sodium) in your diet, follow diet directed by physician. [] Smoking: Cigarette/Cigar: stop smoking. Advised pt. that you can reduce your risk for stroke/TIA by modifying/controlling the risk factors that you have. Pt.advised to take the medications as prescribed, which will be detailed in the discharge instructions, and to not stop taking them without consulting their physician. In addition, pt. advised to maintain a healthy diet, exercise regularly and to not smoke. [facility]'s Stroke treatment and prevention, Managing your recovery  notebook  provided and/or reviewed  with patient/family. The notebook includes, but not limited to, sections addressing warning signs & symptoms of a stroke, which are: sudden numbness or weakness especially on one side of the body, sudden confusion, difficulty speaking or understanding, sudden changes in vision, sudden dizziness or loss of balance/ coordination, or sudden severe headache.   The need to call EMS ([phone]) immediately if signs & symptoms occur is emphasized . The notebook also provides education on Stroke community resources and stroke advocacy. The need for follow-up after discharge was highlighted with patient/family with them being able to repeat understanding of the importance of this.       Electronically signed by Gaetano Mccray RN on 4/22/2021 at 12:38 PM

## 2021-04-22 NOTE — PLAN OF CARE
Problem: SAFETY  Goal: Free from accidental physical injury  4/22/2021 0430 by Nona Mares RN  Outcome: Ongoing  4/22/2021 0429 by Nona Mares RN  Outcome: Ongoing  Goal: Free from intentional harm  4/22/2021 0430 by Nona Mares RN  Outcome: Ongoing  4/22/2021 0429 by Nona Mares RN  Outcome: Ongoing     Problem: DAILY CARE  Goal: Daily care needs are met  4/22/2021 0430 by Nona Mares RN  Outcome: Ongoing  4/22/2021 0429 by Nona Mares RN  Outcome: Ongoing     Problem: PAIN  Goal: Patient's pain/discomfort is manageable  4/22/2021 0430 by Nona Mares RN  Outcome: Ongoing  4/22/2021 0429 by Nona Mares RN  Outcome: Ongoing     Problem: SKIN INTEGRITY  Goal: Skin integrity is maintained or improved  4/22/2021 0430 by Nona Mares RN  Outcome: Ongoing  4/22/2021 0429 by Nona Mares RN  Outcome: Ongoing     Problem: KNOWLEDGE DEFICIT  Goal: Patient/S.O. demonstrates understanding of disease process, treatment plan, medications, and discharge instructions.   4/22/2021 0430 by Nona Mares RN  Outcome: Ongoing  4/22/2021 0429 by Nona Mares RN  Outcome: Ongoing     Problem: DISCHARGE BARRIERS  Goal: Patient's continuum of care needs are met  4/22/2021 0430 by Nona Mares RN  Outcome: Ongoing  4/22/2021 0429 by Nona Mares RN  Outcome: Ongoing     Problem: Falls - Risk of:  Goal: Will remain free from falls  Description: Will remain free from falls  4/22/2021 0430 by Nona Mares RN  Outcome: Ongoing  4/22/2021 0429 by Nona Mares RN  Outcome: Ongoing  Goal: Absence of physical injury  Description: Absence of physical injury  4/22/2021 0430 by Nona Mares RN  Outcome: Ongoing  4/22/2021 0429 by Nona Mares RN  Outcome: Ongoing     Problem: HEMODYNAMIC STATUS  Goal: Patient has stable vital signs and fluid balance  4/22/2021 0430 by Nona Mares RN  Outcome: Ongoing  4/22/2021 0429 by Nona Mares RN  Outcome: Ongoing     Problem: ACTIVITY INTOLERANCE/IMPAIRED MOBILITY  Goal: Mobility/activity is maintained at optimum level for patient  4/22/2021 0430 by Andrade Sprague, RN  Outcome: Ongoing  4/22/2021 0429 by Andrade Sprague, RN  Outcome: Ongoing     Problem: COMMUNICATION IMPAIRMENT  Goal: Ability to express needs and understand communication  4/22/2021 0430 by Andrade Sprague, RN  Outcome: Ongoing  4/22/2021 0429 by Andrade Sprague, RN  Outcome: Ongoing

## 2021-04-22 NOTE — PROGRESS NOTES
Speech Language Pathology  Facility/Department: [address] PROGRESSIVE CARE   CLINICAL BEDSIDE SWALLOW EVALUATION AND DISCHARGE SUMMARY    NAME: Lissette Zuleta  : 1950  MRN: [id number]    ADMISSION DATE: 2021  ADMITTING DIAGNOSIS: has Dizziness on their problem list.   Has a past medical history of Neck pain, Rheumatic carditis, Rheumatic fever, and Thyroid disease. ONSET DATE: 2021    Recent Chest Xray 2021  Impression   No evidence of acute cardiopulmonary disease     MRI BRAIN 2021  Impression   1. No acute intracranial abnormality.  No acute infarct. 2. Mild-to-moderate chronic microvascular ischemic changes     History of Present Illness:  Lissette Zuleta is a 66 y.o. female who presents with vertigo. Around 1600 yesterday afternoon the patient says she was laying down on her R side resting when she turned her head and immediately everything began constantly spinning around her. He says if her opened her eyes the spinning was evident. If she were to move her eyes, head, or change position she felt worse. She was nauseated though never vomited. At one point she tried to get up to go to the bathroom and she could not walk, having to crawl there. After 8+ hours of symptoms, she decided to come to the ED to be evaluated. Initial BP was 163/95. Ct head was w/out any acute findings. CTA head/neck w/out LVO. She says that she felt better after she was administered an anti-emetic shortly after midnight w/ her spinning subsiding. She feels better today, still having a bit of dizziness though no vertigo. She did fairly well w/ therapy. She says that she had a similar experience of diziness about a year ago after she put some sort of eardrop in her R ear. She has been experiencing some intermittent buzzing in her R ear for the past several months though this has never been investigated. She has had 6 neck surgeries.  She also has a hx of parathyroid surgery and Graves disease. She does note 3 episodes of head trauma last year w/out LOC    Date of Eval: 4/22/2021  Evaluating Therapist: Lili Ludwig    Current Diet level:  Current Diet : NPO  Current Liquid Diet : NPO    Primary Complaint  Patient Complaint: denies difficulty swallowing, no speech/language/cognitive change or prior difficulty. Spouse at bedside in agreement. Referral per CVA r/o protocol    Pain:  Pain Assessment  Pain Assessment: 0-10  Pain Level: 0  Patient's Stated Pain Goal: No pain    Reason for Referral  Moi Vences was referred for a bedside swallow evaluation to assess the efficiency of her swallow function, identify signs and symptoms of aspiration and make recommendations regarding safe dietary consistencies, effective compensatory strategies, and safe eating environment. Impression  Dysphagia Diagnosis: Swallow function appears grossly intact  Dysphagia Impression : Pt pleasant, cooperative, oriented x4, follows commands and able to express complex thoughts. Intact speech intelligibility. Pt motivated for PO. Oral phase grossly WFL. Functional pharyngeal phase with suspected age appropriate potential reduced laryngeal elevation. No immediate or delayed overt s/s of aspiration or difficulty. Recommend regular texture diet and thin liquids. Pt and spouse deny necessity for formal SLP/cognitive assessment at this time. MRI negative for acute process. ST will sign off; please re-consult if future needs/difficulty arise. Dysphagia Outcome Severity Scale: Level 6: Within functional limits/Modified independence     Treatment Plan  Requires SLP Intervention: No  D/C Recommendations: Home with intermittent assistance     Recommended Diet and Intervention  Diet Solids Recommendation: Regular  Liquid Consistency Recommendation:  Thin  Recommended Form of Meds: PO     Compensatory Swallowing Strategies  Compensatory Swallowing Strategies: Upright as possible for all oral

## 2021-04-22 NOTE — ED PROVIDER NOTES
[address] [place]      Pt Name: Av Nickerson  MRN: [id number]  [place] 1950  Date of evaluation: 4/21/2021  Provider: Bobby Nieves, [address] [place] [address]       Chief Complaint   Patient presents with    Dizziness     4pm history of vertigo; hasn not gone away         HISTORY OF PRESENT ILLNESS   (Location/Symptom, Timing/Onset, Context/Setting, Quality, Duration, Modifying Factors, Severity)  Note limiting factors. Av Nickerson is a 66 y.o. female who presents to the emergency department with a complaint of severe vertigo. The patient states that she was lying in bed and turned over today at approximately 4 PM and has had severe vertigo since that time. She states that she is not able to open her eyes. Any type of movement makes it worse. She has been nauseated but denies any vomiting. She does report a mild generalized headache. She does have chronic neck pain which is unchanged from baseline. She is having some difficulty walking and states that \"she could not hardly walk\" and required assistance of her  to get to the hospital.  She normally walks without assistance. She does not take any anticoagulants. She denies any vision loss or vision change, facial droop, difficulty speaking, focal weakness or numbness in the extremities. She does report a brief episode of vertigo which was very mild and lasted for a couple of hours several years ago. She denies any prior history of stroke. He denies hypertension, hyperlipidemia or diabetes. She does not smoke. She does not take any anticoagulants. She denies any recent fall trauma or injury. Nursing Notes were reviewed. HPI        REVIEW OF SYSTEMS    (2-9 systems for level 4, 10 or more for level 5)       Constitutional: Negative for fever or chills. HENT: Negative for rhinorrhea and sore throat. Eyes: Negative for redness or drainage.    Respiratory: Negative for shortness of breath or dyspnea on exertion. Cardiovascular: Negative for chest pain. Gastrointestinal: Negative for abdominal pain. Negative for vomiting or diarrhea. Genitourinary: Negative for flank pain. Negative for dysuria. Negative for hematuria. All systems are reviewed and are negative except for those listed above in the history of present illness and ROS. PAST MEDICAL HISTORY     Past Medical History:   Diagnosis Date    Neck pain     Rheumatic carditis     Rheumatic fever     Thyroid disease          SURGICAL HISTORY       Past Surgical History:   Procedure Laterality Date    APPENDECTOMY      CATARACT REMOVAL WITH IMPLANT Left 09/10/2018    Dr. Olga Rivera Right 09/24/2018    COLONOSCOPY      HYSTERECTOMY      NECK SURGERY      X 4    PARATHYROIDECTOMY      IN XCAPSL CTRC RMVL INSJ IO LENS PROSTH W/O ECP Right 9/24/2018    PHACOEMULSIFICATION WITH INTRAOCULAR LENS performed by Mayte Harris MD at [address] [facility] Bilateral     THYROIDECTOMY           CURRENT MEDICATIONS       Previous Medications    ASPIRIN 81 MG TABLET    Take 81 mg by mouth daily    CALCIUM CARBONATE (OSCAL) 500 MG TABS TABLET    Take 500 mg by mouth daily    CHOLECALCIFEROL (VITAMIN D3) 2000 UNITS CAPS    Take by mouth    FOLIC ACID (FOLVITE) 1 MG TABLET    Take 1 mg by mouth daily    L-METHYLFOLATE-ALGAE-B12-B6 (METANX PO)    Take 1 tablet by mouth daily    LEVOTHYROXINE (SYNTHROID) 112 MCG TABLET    Take 112 mcg by mouth Daily    METHOCARBAMOL (ROBAXIN) 500 MG TABLET    Take 500 mg by mouth 4 times daily    MULTIPLE VITAMIN (ONE-A-DAY ESSENTIAL) TABS    Take 1 tablet by mouth daily    OXYCODONE-ACETAMINOPHEN (PERCOCET)  MG PER TABLET    Take 1 tablet by mouth every 4 hours as needed for Pain. [place]     RANITIDINE (ZANTAC) 150 MG TABLET    Take 75 mg by mouth 2 times daily     VITAMIN B-12 (CYANOCOBALAMIN) 100 MCG TABLET    Take 50 mcg abnormality  Total: 0         PHYSICAL EXAM    (up to 7 for level 4, 8 or more for level 5)     ED Triage Vitals [04/21/21 2309]   BP Temp Temp Source Pulse Resp SpO2 Height Weight   (!) 163/95 98.2 °F (36.8 °C) Oral 89 18 97 % -- 173 lb 4.5 oz (78.6 kg)         Physical Exam   Constitutional: Awake and alert. Nauseated. Moderate discomfort. Head: No visible evidence of trauma. Normocephalic. Eyes: Pupils equal and reactive. No photophobia. Conjunctiva normal.  Mild horizontal nystagmus bilaterally with lateral gaze. HENT: Oral mucosa moist.  Airway patent. Pharynx without erythema. Nares were clear. Neck:  Soft and supple. Nontender. No point or axial tenderness. Heart:  Regular rate and rhythm. No murmur. Lungs:  Clear to auscultation. No wheezes, rales, or ronchi. No conversational dyspnea or accessory muscle use. Chest: Chest wall non-tender. No evidence of trauma. Abdomen:  Soft, nondistended, bowel sounds present. Nontender. No guarding rigidity or rebound. No masses. Musculoskeletal: Extremities non-tender with full range of motion. Radial and dorsalis pedis pulses were intact. No calf tenderness erythema or edema. Neurological: Alert and oriented x3. GCS 15. Cranial nerves II through XII were intact. No facial droop. No dysarthria. No aphasia. No pronator drift. No acute focal motor or sensory deficits. Negative finger-to-nose. Negative heel-to-shin. Deep tendon reflexes were 2/4 in the upper and lower extremities bilaterally. Gait steady. No visual field deficits. Negative test of skew. Slight horizontal nystagmus was noted bilaterally. She did have some truncal ataxia when sitting up in the bed. She felt like she was going to fall. She was unable to ambulate at the time of initial exam because of severe vertigo. Skin: Skin is warm and dry. No rash. Lymphatic:  No lympadenopathy. Psychiatric: Normal mood and affect.  Behavior is normal.         DIAGNOSTIC RESULTS     EKG: All EKG's are interpreted by the Emergency Department Physician who either signs or Co-signs this chart in the absence of a cardiologist.    Normal sinus rhythm. Rate 73. CT interval 182 ms. QRS duration 84 ms. QTc 389 ms.  R axis 51 degrees. No ST elevation. Normal EKG. RADIOLOGY:   Non-plain film images such as CT, Ultrasound and MRI are read by the radiologist. Plain radiographic images are visualized and preliminarily interpreted by the emergency physician with the below findings:        Interpretation per the Radiologist below, if available at the time of this note:    XR CHEST PORTABLE   Final Result   No evidence of acute cardiopulmonary disease         CTA HEAD NECK W CONTRAST   Final Result   1. Bilateral carotid bifurcation atherosclerotic plaque and calcification   resulting approximately 50% stenosis of the origin of the bilateral internal   carotid arteries. Finding is compatible with 50-69% stenosis per sonographic   NASCET index criteria. 2. Left subclavian artery origin approximately 60% stenosis secondary to   encroachment by atherosclerotic plaque. 3. Distal right internal carotid artery cervical segment irregular contour   suggesting association with fibromuscular dysplasia (FMD). CT HEAD WO CONTRAST   Final Result   Moderate chronic microischemic disease scattered in the deep white matter   with no acute intracranial abnormality seen. The findings were called to and Dr. Saint Heading at 11:30 p.m.                ED BEDSIDE ULTRASOUND:   Performed by ED Physician - none    LABS:  Labs Reviewed   CBC WITH AUTO DIFFERENTIAL - Abnormal; Notable for the following components:       Result Value    RBC 3.90 (*)     All other components within normal limits    Narrative:     Performed at:  [address] [address]   Phone [phone]   COMPREHENSIVE METABOLIC PANEL W/ REFLEX TO MG FOR LOW K - Abnormal; Notable for the following components:    Glucose 121 (*)     ALT 8 (*)     All other components within normal limits    Narrative:     Performed at:  [facility]  [address] [facility] [facility] [address]   Phone [phone]   URINE RT REFLEX TO CULTURE - Abnormal; Notable for the following components:    Clarity, UA CLOUDY (*)     pH, UA 8.5 (*)     All other components within normal limits    Narrative:     Performed at:  [facility]  [address] [facility] [address] [facility] [address]   Phone [phone]   TROPONIN    Narrative:     Performed at:  [address] [facility] [address]   Phone [phone]   PROTIME-INR    Narrative:     Performed at:  [facility]  [facility] [address] [facility] [address] [facility] [address]   Phone [phone]   MICROSCOPIC URINALYSIS    Narrative:     Performed at:  [address] [facility] [address]   Phone [phone]   POCT GLUCOSE       All other labs were within normal range or not returned as of this dictation. EMERGENCY DEPARTMENT COURSE and DIFFERENTIAL DIAGNOSIS/MDM:   Vitals:    Vitals:    04/21/21 2309 04/22/21 0145   BP: (!) 163/95 (!) 164/96   Pulse: 89 81   Resp: 18 12   Temp: 98.2 °F (36.8 °C)    TempSrc: Oral    SpO2: 97% 95%   Weight: 173 lb 4.5 oz (78.6 kg)          MDM      The patient presents with sudden onset of vertigo today at 4 PM as noted above. Timing initial examination she was nauseated. She was unable to keep her eyes open. She was noted to have some truncal ataxia when sitting up in bed but negative finger-to-nose and negative heel-to-shin. There were no motor or sensory deficits. NIH stroke scale was 0.   Stroke alert was activated at the time of physician exam.    11:26 PM: I spoke with Dr. Terrance Qiu from the [facility] stroke team.  She is

## 2021-04-22 NOTE — CARE COORDINATION
INITIAL CASE MANAGEMENT ASSESSMENT     Reviewed chart, spoke with patient and spouse to assess possible discharge needs. Explained Case Management role/services. Living Situation: Patient lives in a [address] with spouse;     ADLs: Independent      DME: None     PT/OT Recs:   Date of Service: 4/22/2021     Discharge Recommendations:  Patient would benefit from continued therapy after discharge, Home with assist PRN, Outpatient PT   PT Equipment Recommendations  Other: May require a RW     Continue to assess pending progress, Home with assist PRN  OT Equipment Recommendations  Equipment Needed: No     Discussed recommendation with patient. Patient agreeable to outpatient physical therapy. Patient requested order. Active Services: No active services. Transportation: Patient is an active ; spouse to transport at discharge. Medications: [facility] on [address]     PCP: Dr. Pinky Beckwith at [facility]    Patient is unable to locate his contact information at this time. HD/PD: N/A    PLAN/COMMENTS: Patient plans to return home with spouse at discharge. No anticipated needs. Patient has large family support of children and 10 grandchildren. 1)Will need Outpatient PT orders. SW/CM provided contact information for patient or family to call with any questions. SW/CM will follow and assist as needed.     TIFFANY Shultz, [place], Social Work  [phone]  Electronically signed by TIFFANY Shultz, [facility] on 4/22/2021 at 11:54 AM

## 2021-04-22 NOTE — CONSULTS
Neurology Consult Note  Reason for Consult: dizziness    Chief complaint: vertigo    Dr Michael Booker MD asked me to see Lizzie Minor in consultation for evaluation of dizziness    History of Present Illness:  Lizzie Minor is a 66 y.o. female who presents with vertigo    I obtained my information via interview w/ the patient, supplemented by chart review. Around 1600 yesterday afternoon the patient says she was laying down on her R side resting when she turned her head and immediately everything began constantly spinning around her. He says if her opened her eyes the spinning was evident. If she were to move her eyes, head, or change position she felt worse. She was nauseated though never vomited. At one point she tried to get up to go to the bathroom and she could not walk, having to crawl there. After 8+ hours of symptoms, she decided to come to the ED to be evaluated. Initial BP was 163/95. Ct head was w/out any acute findings. CTA head/neck w/out LVO. She says that she felt better after she was administered an anti-emetic shortly after midnight w/ her spinning subsiding. She feels better today, still having a bit of dizziness though no vertigo. She did fairly well w/ therapy. She says that she had a similar experience of diziness about a year ago after she put some sort of eardrop in her R ear. She has been experiencing some intermittent buzzing in her R ear for the past several months though this has never been investigated. She has had 6 neck surgeries. She also has a hx of parathyroid surgery and Graves disease. She does note 3 episodes of head trauma last year w/out LOC.       Medical History:  Past Medical History:   Diagnosis Date    Neck pain     Rheumatic carditis     Rheumatic fever     Thyroid disease      Past Surgical History:   Procedure Laterality Date    APPENDECTOMY      CATARACT REMOVAL WITH IMPLANT Left 09/10/2018    Dr. Jackie Noonan WITH IMPLANT Right 09/24/2018    COLONOSCOPY      HYSTERECTOMY      NECK SURGERY      X 4    PARATHYROIDECTOMY      [place] XCAPSL CTRC RMVL INSJ IO LENS PROSTH W/O ECP Right 9/24/2018    PHACOEMULSIFICATION WITH INTRAOCULAR LENS performed by Deric Shelley MD at [address] Bilateral     THYROIDECTOMY       Scheduled Meds:   calcium elemental  500 mg Oral Daily    folic acid  1 mg Oral Daily    levothyroxine  112 mcg Oral Daily    multivitamin  1 tablet Oral Daily    vitamin B-12  50 mcg Oral Daily    sodium chloride flush  5-40 mL Intravenous 2 times per day    enoxaparin  40 mg Subcutaneous Daily    aspirin  81 mg Oral Daily    Or    aspirin  300 mg Rectal Daily    rosuvastatin  40 mg Oral Nightly     Continuous Infusions:   sodium chloride 1,000 mL (04/22/21 0006)     Medications Prior to Admission:   oxyCODONE (ROXICODONE) 5 MG immediate release tablet, Take 5 mg by mouth every 8 hours as needed for Pain. aspirin 81 MG tablet, Take 81 mg by mouth daily  calcium carbonate (OSCAL) 500 MG TABS tablet, Take 500 mg by mouth daily  Multiple Vitamin (ONE-A-DAY ESSENTIAL) TABS, Take 1 tablet by mouth daily  Cholecalciferol (VITAMIN D3) 2000 units CAPS, Take by mouth  vitamin B-12 (CYANOCOBALAMIN) 100 MCG tablet, Take 50 mcg by mouth daily  L-Methylfolate-Algae-B12-B6 (METANX PO), Take 1 tablet by mouth daily  folic acid (FOLVITE) 1 MG tablet, Take 1 mg by mouth daily  levothyroxine (SYNTHROID) 112 MCG tablet, Take 112 mcg by mouth Daily  methocarbamol (ROBAXIN) 500 MG tablet, Take 500 mg by mouth 4 times daily  ranitidine (ZANTAC) 150 MG tablet, Take 75 mg by mouth 2 times daily   oxyCODONE-acetaminophen (PERCOCET)  MG per tablet, Take 1 tablet by mouth every 4 hours as needed for Pain. Bettey Grad No Known Allergies    History reviewed. No pertinent family history.     Social History     Tobacco Use   Smoking Status Never Smoker   Smokeless Tobacco Never Used     Social History     Substance and Sexual Activity   Drug Use No     Social History     Substance and Sexual Activity   Alcohol Use Yes    Comment: rare     ROS:  Constitutional- No weight loss or fevers  Eyes- No diplopia. No photophobia. Ears/nose/throat- No dysphagia. No Dysarthria  Cardiovascular- No palpitations. No chest pain  Respiratory- No dyspnea. No Cough  Gastrointestinal- No Abdominal pain. No Vomiting. Genitourinary- No incontinence. No urinary retention  Musculoskeletal- No myalgia. No arthralgia  Skin- No rash. No easy bruising. Psychiatric- No depression. No anxiety  Endocrine- No diabetes. No thyroid issues. Hematologic- No bleeding difficulty. No fatigue  Neurologic- No weakness. No Headache. Exam:  Blood pressure (!) 147/74, pulse 75, temperature 97.6 °F (36.4 °C), temperature source Oral, resp. rate 16, height 5' 2.5\" (1.588 m), weight 158 lb 8.2 oz (71.9 kg), SpO2 94 %. Constitutional    Vital signs: BP, HR, and RR reviewed   General alert, no distress, well-nourished  Eyes: unable to visualize the fundi  Cardiovascular: pulses symmetric in all 4 extremities. No peripheral edema. Psychiatric: cooperative with examination, no psychotic behavior noted. Neurologic  Mental status:   orientation to person, place, time, situation. General fund of knowledge grossly intact   Memory grossly intact   Attention intact as able to attend well to the exam     Language fluent in conversation   Comprehension intact; follows simple commands  Cranial nerves:   CN2: visual fields full  CN 3,4,6: extraocular muscles intact. No persistent nystagmus.     CN5: facial sensation symmetric   CN7: face symmetric without dysarthria  CN8: hearing grossly intact  CN9: palate elevated symmetrically  CN11: trap full strength on shoulder shrug  CN12: tongue midline with protrusion  Strength: good strength in all 4 extremities   Deep tendon reflexes: normal in all 4 extremities  Sensory: light touch intact in all 4 extremities. Cerebellar/coordination: finger nose finger normal without ataxia  Tone: normal in all 4 extremities  Gait: deferred at this time for safety. Labs  Glucose 121  Na 137  K 4.1  BUN 11  Cr 0.6  ALT 8  AST 18    WBC 7.1K  Hg 12.3  Platelets 575    Cholesterol, Total 231 (H)   HDL Cholesterol 61 (H)   LDL Calculated 156 (H)   Triglycerides 72   VLDL Cholesterol Calculated 14     UA negative    Studies  CT head w/o 4/21/21, independently reviewed  No acute abnormality. CTA head/neck 4/21/21, independently reviewed  1. Bilateral carotid bifurcation atherosclerotic plaque and calcification   resulting approximately 50% stenosis of the origin of the bilateral internal   carotid arteries.  Finding is compatible with 50-69% stenosis per sonographic   NASCET index criteria. 2. Left subclavian artery origin approximately 60% stenosis secondary to   encroachment by atherosclerotic plaque. 3. Distal right internal carotid artery cervical segment irregular contour   suggesting association with fibromuscular dysplasia (FMD). TTE - pending. EKG 4/21/21  NSR    Impression  1. Persistent vertigo. This is much improved though still w/ some intermittent dizziness. I did not appreciate any areas of acute stroke on brain MRI though will await official read. Posterior circulation TIA is a consideration though positive Lavera Forward would favor peripheral vestibular etiology. 2.  Moderate bilateral carotid stenosis. 3.  Possible FMD.    4.  Hyperlipidemia. 5.  Cervical spine disease. 6.  Hx rheumatic fever. Recommendations  1. Vestibular therapy may be helpful. ENT evaluation outpatient could be an option if symptoms persist.    2.  Continue antiplatelet. Agree w/ statin given atherosclerotic disease and LDL elevation. Would still recommend vascular risk factor modification. 3.  Telemetry.         Alpha Reap NP  [address] [facility] [address] Neurology    A copy of this note was provided for  Ck Mari MD

## 2021-04-22 NOTE — PROGRESS NOTES
Occupational Therapy   Occupational Therapy Initial Assessment and Tentative D/C    This note to serve as d/c summary should pt d/c prior to next session. Date: 2021   Patient Name: Marvella Lombard  MRN: [id number]     : 1950    Date of Service: 2021    Discharge Recommendations: Marvella Lombard scored a 21/24 on the [facility] ADL Inpatient form. At this time, no further OT is recommended upon discharge due to anticipate pt functioning at/close to baseline with resolved dizziness. Recommend patient returns to prior setting with prior services. Continue to assess pending progress, Home with assist PRN  OT Equipment Recommendations  Equipment Needed: No    Assessment   Performance deficits / Impairments: Decreased functional mobility ; Decreased balance;Decreased high-level IADLs;Decreased ADL status  Assessment: Marvella Lombard is a 66 y.o. female who presents to the emergency department with a complaint of severe vertigo. The patient states that she was lying in bed and turned over today at approximately 4 PM and has had severe vertigo since that time. She states that she is not able to open her eyes. Any type of movement makes it worse. She has been nauseated but denies any vomiting. She does report a mild generalized headache. She does have chronic neck pain which is unchanged from baseline. She is having some difficulty walking and states that \"she could not hardly walk\" and required assistance of her  to get to the hospital.  She normally walks without assistance. She does not take any anticoagulants. She denies any vision loss or vision change, facial droop, difficulty speaking, focal weakness or numbness in the extremities. PTA pt from home with  where pt was Ind with mobility and ADLs. Pt currently functioning slightly below baseline completing mobility and transfers with supervision/SBA and no device.  Pt reports minimal dizziness/light headedness although is  to get to the hospital.  She normally walks without assistance. She does not take any anticoagulants. She denies any vision loss or vision change, facial droop, difficulty speaking, focal weakness or numbness in the extremities. Family / Caregiver Present: No  Referring Practitioner: BETINA Altman CNP  Subjective  Subjective: Pt agreeable to OT evaluation. Pt reports dizziness/light headedness has improved but still there. Pt reports no pain. Social/Functional History  Social/Functional History  Lives With: Spouse  Type of Home: (condo)  Home Layout: One level(2nd floor condo)  Home Access: Elevator, Level entry  Bathroom Shower/Tub: Tub/Shower unit, Walk-in shower(uses walk in shower)  Bathroom Toilet: Handicap height  Bathroom Equipment: Grab bars in shower, Shower chair  Home Equipment: Rolling walker  ADL Assistance: Independent  Homemaking Assistance: Independent  Homemaking Responsibilities: Yes  Ambulation Assistance: Independent  Transfer Assistance: Independent  Active : Yes  Occupation: Retired       Objective   Vision: Impaired  Vision Exceptions: Wears glasses for reading  Hearing: Within functional limits    Orientation  Overall Orientation Status: Within Functional Limits  Orientation Level: Oriented X4     Balance  Sitting Balance: Independent  Standing Balance: Supervision  Functional Mobility  Functional - Mobility Device: No device  Activity: To/from bathroom; Other(short household distances in room; ~25ft, 40ft)  Assist Level: Stand by assistance(supervision/SBA)  Functional Mobility Comments: no overt LOB; pt reports no increase in initial dizziness/light headedness than in supine although reports still noticeable  Toilet Transfers  Toilet - Technique: Ambulating  Equipment Used: Standard toilet  Toilet Transfer: Supervision  Wheelchair Bed Transfers  Wheelchair/Bed - Technique: Ambulating  Equipment Used: Bed;Other(bed to chair)  Level of Asssistance: Supervision ADL  Additional Comments: Anticipate pt needing up to supervision for ADLs including dressing, bathing, and toileting based on ROM, strength, and balance  Tone RUE  RUE Tone: Normotonic  Tone LUE  LUE Tone: Normotonic  Coordination  Movements Are Fluid And Coordinated: Yes     Bed mobility  Supine to Sit: Independent  Sit to Supine: Unable to assess  Scooting: Independent  Transfers  Sit to stand: Supervision  Stand to sit: Supervision     Cognition  Overall Cognitive Status: WFL        Sensation  Overall Sensation Status: Impaired(reports some numbness/tingling in R LE due to history of back/neck issues)        LUE AROM (degrees)  LUE AROM : WFL  RUE AROM (degrees)  RUE AROM : WFL  LUE Strength  Gross LUE Strength: WFL  L Hand General: 5/5  RUE Strength  Gross RUE Strength: WFL  R Hand General: 5/5                   Plan   Plan  Times per week: 2-3x  Current Treatment Recommendations: Balance Training, Functional Mobility Training, Safety Education & Training, Patient/Caregiver Education & Training, Gait Training      AM-PAC Score        AM-PAC Inpatient Daily Activity Raw Score: 21 (04/22/21 0743)  AM-PAC Inpatient ADL T-Scale Score : 44.27 (04/22/21 0743)  ADL Inpatient CMS 0-100% Score: 32.79 (04/22/21 0743)  ADL Inpatient CMS G-Code Modifier : Susy Joy (04/22/21 0913)    Goals  Short term goals  Time Frame for Short term goals: prior to D/C  Short term goal 1: complete functional mobility and transfers Ind  Short term goal 2: complete toileting Ind  Short term goal 3: complete grooming in stance at sink Ind  Short term goal 4: complete LB dressing Ind  Long term goals  Time Frame for Long term goals : STG=LTG  Patient Goals   Patient goals : return home       Therapy Time   Individual Concurrent Group Co-treatment   Time In 0715         Time Out 0740         Minutes 25         Timed Code Treatment Minutes: 10 Minutes(15 minute eval)       Prisca Marmolejo, OTR/L

## 2021-04-22 NOTE — PROGRESS NOTES
Patient is admitted to room 5269 from the emergency room. Patient has arrived to the floor at this time via stretcher and ambulated/transfered to bed. Oriented to room. Call light and bedside table within reach. Denies further needs at this time. Will continue to monitor.  Helena Addison       Electronically signed by Helena Addison RN on 4/22/2021 at 3:38 AM

## 2021-04-22 NOTE — H&P
alert and oriented. Vital signs are stable. No diaphoresis. No facial drooping. No tongue fasciculation. No evidence of facial cranial nerve palsy. Shoulder shrug is symmetrical and strong, bilateral upper and bilateral lower extremity sensation intact and motor strength are strong. She has bilateral fatigable lateral nystagmus primarily to the left. Head position change and ocular movements does induce a mild return of dizziness. She describes it as a wall spinning or ceiling spinning with nausea associated. Lungs are clear. No evidence of respiratory distress. Abdomen is soft. Patient spouse is at bedside. CODE STATUS: Full  Past Medical History:        Diagnosis Date    Neck pain     Rheumatic carditis     Rheumatic fever     Thyroid disease        Past Surgical History:        Procedure Laterality Date    APPENDECTOMY      CATARACT REMOVAL WITH IMPLANT Left 09/10/2018    Dr. Rosie Pierre Right 09/24/2018    COLONOSCOPY      HYSTERECTOMY      NECK SURGERY      X 4    PARATHYROIDECTOMY      [place] XCAPSL CTRC RMVL INSJ IO LENS PROSTH W/O ECP Right 9/24/2018    PHACOEMULSIFICATION WITH INTRAOCULAR LENS performed by Yasmin Harley MD at [facility] Bilateral     THYROIDECTOMY         Medications Prior to Admission:    Prior to Admission medications    Medication Sig Start Date End Date Taking? Authorizing Provider   ranitidine (ZANTAC) 150 MG tablet Take 75 mg by mouth 2 times daily     Historical Provider, MD   aspirin 81 MG tablet Take 81 mg by mouth daily    Historical Provider, MD   methocarbamol (ROBAXIN) 500 MG tablet Take 500 mg by mouth 4 times daily    Historical Provider, MD   oxyCODONE-acetaminophen (PERCOCET)  MG per tablet Take 1 tablet by mouth every 4 hours as needed for Pain. Davide Iniguez     Historical Provider, MD   calcium carbonate (OSCAL) 500 MG TABS tablet Take 500 mg by mouth daily    Historical Provider, MD Multiple Vitamin (ONE-A-DAY ESSENTIAL) TABS Take 1 tablet by mouth daily    Historical Provider, MD   Cholecalciferol (VITAMIN D3) 2000 units CAPS Take by mouth    Historical Provider, MD   vitamin B-12 (CYANOCOBALAMIN) 100 MCG tablet Take 50 mcg by mouth daily    Historical Provider, MD   L-Methylfolate-Algae-B12-B6 (METANX PO) Take 1 tablet by mouth daily    Historical Provider, MD   folic acid (FOLVITE) 1 MG tablet Take 1 mg by mouth daily    Historical Provider, MD   levothyroxine (SYNTHROID) 112 MCG tablet Take 112 mcg by mouth Daily    Historical Provider, MD       Allergies:  Patient has no known allergies. Social History:  The patient currently lives at home with     TOBACCO:   reports that she has never smoked. She has never used smokeless tobacco.  ETOH:   reports current alcohol use. Family History:  Reviewed in detail and negative for DM, Early CAD, Cancer, CVA. Positive as follows:    History reviewed. No pertinent family history. REVIEW OF SYSTEMS:   Positive for dizziness and as noted in the HPI. All other systems reviewed and negative. PHYSICAL EXAM:    BP (!) 164/96   Pulse 81   Temp 98.2 °F (36.8 °C) (Oral)   Resp 12   Wt 173 lb 4.5 oz (78.6 kg)   SpO2 95%   BMI 30.70 kg/m²     General appearance: No apparent distress appears stated age and cooperative. HEENT: Bilateral pupils 3 to 4 mm, bilateral lateral fatigable nystagmus. Neck: Supple, No jugular venous distention/bruits. Trachea midline without thyromegaly or adenopathy   Cervical spine range of motion: Somewhat limited due to chronic pain. Lungs: Clear to auscultation, bilaterally without Rales/Wheezes/Rhonchi with good respiratory effort. Heart: Regular rate and rhythm with Normal S1/S2 without murmurs, rubs or gallops, point of maximum impulse non-displaced  Abdomen: Soft, non-tender or non-distended without rigidity or guarding and positive bowel sounds all four quadrants.   Extremities: No clubbing, cyanosis, or edema bilaterally. Full range of motion without deformity and normal gait intact. Skin: Skin color, texture, turgor normal.  No rashes or lesions. Neurologic: Alert and oriented X 3, neurovascularly intact with sensory/motor intact upper extremities/lower extremities, bilaterally. Cranial nerves: II-XII intact, no focal motor or sensory deficit. I did not ambulate this patient given mild dizziness with position change. No evidence of extremity ataxia: Finger-to-nose, heel-to-shin intact   Performs thumbs up sign, okay sign, abduction adduction x5 digits bilaterally. sensation x4 extremities  Speech is clear and articulate. No facial drooping. No lid lag. Test of skew: Negative  NIH stroke scale score 0  Mental status: Alert, oriented, thought content appropriate.   Capillary Refill: Acceptable  < 3 seconds  Peripheral Pulses: +3 Easily felt, not easily obliterated with pressure      CXR:  I have reviewed the CXR with the following interpretation: NAD  EKG:  I have reviewed the EKG with the following interpretation: Sinus rhythm: Rate 73, CO interval 182, QRS 8 4,         CBC   Recent Labs     04/22/21  0003   WBC 7.1   HGB 12.3   HCT 36.3         RENAL  Recent Labs     04/22/21  0003      K 4.1      CO2 25   BUN 11   CREATININE 0.6     LFT'S  Recent Labs     04/22/21  0003   AST 18   ALT 8*   BILITOT 0.3   ALKPHOS 84     COAG  Recent Labs     04/22/21  0003   INR 1.00     CARDIAC ENZYMES  Recent Labs     04/22/21 0003   TROPONINI <0.01       U/A:    Lab Results   Component Value Date    COLORU YELLOW 04/21/2021    WBCUA 1 04/21/2021    RBCUA 1 04/21/2021    CLARITYU CLOUDY 04/21/2021    SPECGRAV 1.026 04/21/2021    LEUKOCYTESUR Negative 04/21/2021    BLOODU Negative 04/21/2021    GLUCOSEU Negative 04/21/2021       ABG  No results found for: IOH4WUK, BEART, U2GOSAMT, PHART, THGBART, JJJ8LET, PO2ART, QQC8DBS        Active Hospital Problems    Diagnosis Date Noted    Dizziness [R42] 04/22/2021         PHYSICIANS CERTIFICATION:    I certify that Lilly Pritchett is expected to be hospitalized for more than 2 midnights based on the following assessment and plan:      ASSESSMENT/PLAN:    Dizziness/vertigo: Acute onset with rolling over, worsens with movement  DDx: BPPV, posterior circulation CVA, vestibular nerve dysfunction, neuroma  Continue aspirin and statin  Echo  MRI of the brain without contrast  Neurology consult  Oral Valium twice daily as needed and Antivert 3 times daily as needed for symptomatic treatment  PT/OT/speech therapy consult  NIH SS/neuro checks    Atherosclerosis bilateral carotid arteries: As evidenced on CTA head and neck: 50 to 69%  Left subclavian stenosis: 60%: As evidenced on CTA neck    Chronic neck pain: Percocet 10 mg as needed home regimen  Held at this time due to dizziness/vertigo symptomatic treatment    Hypothyroid: Continue levothyroxine      DVT Prophylaxis: Lovenox  Diet: Diet NPO Effective Now  Code Status: Prior  PT/OT Eval Status: Consulted    Dispo - admit, inpt       Gunnar Ruff, APRN - CNP    Thank you Chapito Aguilar for the opportunity to be involved in this patient's care. If you have any questions or concerns please feel free to contact me at [phone].

## 2021-04-23 LAB
ESTIMATED AVERAGE GLUCOSE: 131.2 MG/DL
HBA1C MFR BLD: 6.2 %
HCT VFR BLD CALC: 37.6 % (ref 36–48)
HEMOGLOBIN: 12.4 G/DL (ref 12–16)
MCH RBC QN AUTO: 31.1 PG (ref 26–34)
MCHC RBC AUTO-ENTMCNC: 32.9 G/DL (ref 31–36)
MCV RBC AUTO: 94.4 FL (ref 80–100)
PDW BLD-RTO: 14.1 % (ref 12.4–15.4)
PLATELET # BLD: 274 K/UL (ref 135–450)
PMV BLD AUTO: 7.9 FL (ref 5–10.5)
RBC # BLD: 3.98 M/UL (ref 4–5.2)
WBC # BLD: 6.5 K/UL (ref 4–11)

## 2021-04-23 PROCEDURE — 97530 THERAPEUTIC ACTIVITIES: CPT

## 2021-04-23 PROCEDURE — 85027 COMPLETE CBC AUTOMATED: CPT

## 2021-04-23 PROCEDURE — 6370000000 HC RX 637 (ALT 250 FOR IP): Performed by: NURSE PRACTITIONER

## 2021-04-23 PROCEDURE — 36415 COLL VENOUS BLD VENIPUNCTURE: CPT

## 2021-04-23 PROCEDURE — 6370000000 HC RX 637 (ALT 250 FOR IP): Performed by: INTERNAL MEDICINE

## 2021-04-23 PROCEDURE — 2060000000 HC ICU INTERMEDIATE R&B

## 2021-04-23 PROCEDURE — 83036 HEMOGLOBIN GLYCOSYLATED A1C: CPT

## 2021-04-23 PROCEDURE — 2580000003 HC RX 258: Performed by: NURSE PRACTITIONER

## 2021-04-23 PROCEDURE — 94760 N-INVAS EAR/PLS OXIMETRY 1: CPT

## 2021-04-23 PROCEDURE — 6360000002 HC RX W HCPCS: Performed by: NURSE PRACTITIONER

## 2021-04-23 PROCEDURE — 87449 NOS EACH ORGANISM AG IA: CPT

## 2021-04-23 RX ORDER — DEXAMETHASONE SODIUM PHOSPHATE 1 MG/ML
4 SOLUTION/ DROPS OPHTHALMIC 2 TIMES DAILY
Status: DISCONTINUED | OUTPATIENT
Start: 2021-04-23 | End: 2021-04-24 | Stop reason: HOSPADM

## 2021-04-23 RX ORDER — ACYCLOVIR 200 MG/1
400 CAPSULE ORAL 4 TIMES DAILY
Status: DISCONTINUED | OUTPATIENT
Start: 2021-04-23 | End: 2021-04-24 | Stop reason: HOSPADM

## 2021-04-23 RX ORDER — CIPROFLOXACIN HYDROCHLORIDE 3.5 MG/ML
4 SOLUTION/ DROPS TOPICAL 2 TIMES DAILY
Status: DISCONTINUED | OUTPATIENT
Start: 2021-04-23 | End: 2021-04-24 | Stop reason: HOSPADM

## 2021-04-23 RX ORDER — CIPROFLOXACIN AND DEXAMETHASONE 3; 1 MG/ML; MG/ML
4 SUSPENSION/ DROPS AURICULAR (OTIC) 2 TIMES DAILY
Status: DISCONTINUED | OUTPATIENT
Start: 2021-04-23 | End: 2021-04-23

## 2021-04-23 RX ORDER — PREDNISONE 20 MG/1
40 TABLET ORAL DAILY
Status: DISCONTINUED | OUTPATIENT
Start: 2021-04-23 | End: 2021-04-24 | Stop reason: HOSPADM

## 2021-04-23 RX ADMIN — Medication 50 MCG: at 08:54

## 2021-04-23 RX ADMIN — ROSUVASTATIN CALCIUM 40 MG: 40 TABLET, FILM COATED ORAL at 20:20

## 2021-04-23 RX ADMIN — DEXAMETHASONE SODIUM PHOSPHATE 4 DROP: 1 SOLUTION/ DROPS OPHTHALMIC at 20:21

## 2021-04-23 RX ADMIN — THERA TABS 1 TABLET: TAB at 08:48

## 2021-04-23 RX ADMIN — CARBAMIDE PEROXIDE 6.5% 5 DROP: 6.5 LIQUID AURICULAR (OTIC) at 14:02

## 2021-04-23 RX ADMIN — ASPIRIN 81 MG: 81 TABLET, COATED ORAL at 08:47

## 2021-04-23 RX ADMIN — SODIUM CHLORIDE, PRESERVATIVE FREE 10 ML: 5 INJECTION INTRAVENOUS at 20:20

## 2021-04-23 RX ADMIN — CIPROFLOXACIN 4 DROP: 3 SOLUTION OPHTHALMIC at 14:52

## 2021-04-23 RX ADMIN — ACYCLOVIR 400 MG: 200 CAPSULE ORAL at 14:00

## 2021-04-23 RX ADMIN — LEVOTHYROXINE SODIUM 112 MCG: 0.11 TABLET ORAL at 06:40

## 2021-04-23 RX ADMIN — CARBAMIDE PEROXIDE 6.5% 5 DROP: 6.5 LIQUID AURICULAR (OTIC) at 17:34

## 2021-04-23 RX ADMIN — PREDNISONE 40 MG: 20 TABLET ORAL at 14:00

## 2021-04-23 RX ADMIN — CALCIUM 500 MG: 500 TABLET ORAL at 08:47

## 2021-04-23 RX ADMIN — SODIUM CHLORIDE, PRESERVATIVE FREE 10 ML: 5 INJECTION INTRAVENOUS at 08:47

## 2021-04-23 RX ADMIN — DEXAMETHASONE SODIUM PHOSPHATE 4 DROP: 1 SOLUTION/ DROPS OPHTHALMIC at 15:23

## 2021-04-23 RX ADMIN — FOLIC ACID 1 MG: 1 TABLET ORAL at 08:48

## 2021-04-23 RX ADMIN — ACETAMINOPHEN 650 MG: 325 TABLET ORAL at 20:20

## 2021-04-23 RX ADMIN — ENOXAPARIN SODIUM 40 MG: 40 INJECTION SUBCUTANEOUS at 08:47

## 2021-04-23 RX ADMIN — ACYCLOVIR 400 MG: 200 CAPSULE ORAL at 20:21

## 2021-04-23 RX ADMIN — ACYCLOVIR 400 MG: 200 CAPSULE ORAL at 17:33

## 2021-04-23 RX ADMIN — CIPROFLOXACIN 4 DROP: 3 SOLUTION OPHTHALMIC at 20:21

## 2021-04-23 ASSESSMENT — PAIN DESCRIPTION - ORIENTATION: ORIENTATION: UPPER

## 2021-04-23 ASSESSMENT — PAIN DESCRIPTION - LOCATION
LOCATION: HEAD
LOCATION: HEAD

## 2021-04-23 ASSESSMENT — PAIN SCALES - GENERAL
PAINLEVEL_OUTOF10: 7
PAINLEVEL_OUTOF10: 0

## 2021-04-23 ASSESSMENT — PAIN DESCRIPTION - ONSET
ONSET: ON-GOING
ONSET: ON-GOING

## 2021-04-23 ASSESSMENT — PAIN DESCRIPTION - DESCRIPTORS
DESCRIPTORS: ACHING
DESCRIPTORS: ACHING;CONSTANT

## 2021-04-23 ASSESSMENT — PAIN DESCRIPTION - PAIN TYPE: TYPE: ACUTE PAIN

## 2021-04-23 NOTE — PROGRESS NOTES
Occupational Therapy  [facility] PRO[facility]  Daily Treatment Note and Tentative D/C    This note to serve as d/c summary should pt d/c prior to next session. NAME: Grzegorz Cabello  : 1950  MRN: [id number]    Date of Service: 2021    Discharge Recommendations: Grzegorz Cabello scored a 21/24 on the AM-PAC ADL Inpatient form. At this time, no further OT is recommended upon discharge due to anticipate pt functioning at/close to baseline at time of D/C. Recommend patient returns to prior setting with prior services. Continue to assess pending progress, Home with assist PRN  OT Equipment Recommendations  Equipment Needed: No    Assessment   Performance deficits / Impairments: Decreased functional mobility ; Decreased balance;Decreased high-level IADLs;Decreased ADL status  Assessment: Pt tolerated session well. Pt completes functional mobility and transfers with SBA ~75ft with no overt LOB and no device. Pt reports she continues to have some noted light headedness/dizziness. Pt educated on use of RW at home as needed. Pt verbalized understanding. Pt reports having PRN assist from family upon D/C. Continue with POC. Prognosis: Good  OT Education: OT Role;Plan of Care;Transfer Training  REQUIRES OT FOLLOW UP: Yes  Activity Tolerance  Activity Tolerance: Patient Tolerated treatment well  Safety Devices  Safety Devices in place: Yes  Type of devices: Left in chair;Call light within reach;Nurse notified; Chair alarm in place         Patient Diagnosis(es): The encounter diagnosis was Acute severe vertigo. has a past medical history of Neck pain, Rheumatic carditis, Rheumatic fever, and Thyroid disease. has a past surgical history that includes Neck surgery; parathyroidectomy; Thyroidectomy; Rotator cuff repair (Bilateral); Appendectomy; Colonoscopy; Hysterectomy; Cataract removal with implant (Left, 09/10/2018);  Cataract removal with implant (Right, 2018); and pr xcapsl ctrc rmvl insj io lens prosth w/o ecp (Right, 9/24/2018). Restrictions  Restrictions/Precautions  Restrictions/Precautions: Fall Risk  Position Activity Restriction  Other position/activity restrictions: Dizziness/vertigo  Subjective   General  Chart Reviewed: Yes  Patient assessed for rehabilitation services?: Yes  Additional Pertinent Hx: per ED note, Nanette Butterfield is a 66 y.o. female who presents to the emergency department with a complaint of severe vertigo. The patient states that she was lying in bed and turned over today at approximately 4 PM and has had severe vertigo since that time. She states that she is not able to open her eyes. Any type of movement makes it worse. She has been nauseated but denies any vomiting. She does report a mild generalized headache. She does have chronic neck pain which is unchanged from baseline. She is having some difficulty walking and states that \"she could not hardly walk\" and required assistance of her  to get to the hospital.  She normally walks without assistance. She does not take any anticoagulants. She denies any vision loss or vision change, facial droop, difficulty speaking, focal weakness or numbness in the extremities. Family / Caregiver Present: No  Referring Practitioner: BETINA Altman CNP  Subjective  Subjective: Pt agreeable to OT treatment. Pt reports no pain. Pt reports excited that MD might be able to solve dizziness/light headedness. Orientation  Orientation  Overall Orientation Status: Within Functional Limits  Objective    ADL  Additional Comments: Pt declined need for ADLs at this time.         Balance  Sitting Balance: Independent  Standing Balance: Supervision  Functional Mobility  Functional - Mobility Device: No device  Activity: Other(household distances in room and hallway; ~75ft x2)  Assist Level: Stand by assistance  Functional Mobility Comments: no overt LOB; discussed with pt using RW at home with having continued or worsening dizziness or light headedness. Pt reports understanding.   Wheelchair Bed Transfers  Wheelchair/Bed - Technique: Ambulating  Equipment Used: Bed;Other(bed to chair)  Level of Asssistance: Supervision  Bed mobility  Supine to Sit: Independent  Sit to Supine: Unable to assess  Scooting: Independent  Transfers  Sit to stand: Supervision  Stand to sit: Supervision        Cognition  Overall Cognitive Status: Ciarra 89  Times per week: 2-3x  Current Treatment Recommendations: Balance Training, Functional Mobility Training, Safety Education & Training, Patient/Caregiver Education & Training, Gait Training    AM-PAC Score        AM-PAC Inpatient Daily Activity Raw Score: 21 (04/23/21 1338)  AM-PAC Inpatient ADL T-Scale Score : 44.27 (04/23/21 1338)  ADL Inpatient CMS 0-100% Score: 32.79 (04/23/21 1338)  ADL Inpatient CMS G-Code Modifier : Gracie Woodbury (04/23/21 1338)    Goals  Short term goals  Time Frame for Short term goals: prior to D/C; ongoing 4/23  Short term goal 1: complete functional mobility and transfers Ind  Short term goal 2: complete toileting Ind  Short term goal 3: complete grooming in stance at sink Ind  Short term goal 4: complete LB dressing Ind  Long term goals  Time Frame for Long term goals : STG=LTG  Patient Goals   Patient goals : return home       Therapy Time   Individual Concurrent Group Co-treatment   Time In 1309         Time Out 1335         Minutes 26         Timed Code Treatment Minutes: 26 Minutes       Fina Gauthier OTR/L

## 2021-04-23 NOTE — PLAN OF CARE
Problem: SAFETY  Goal: Free from accidental physical injury  4/23/2021 1157 by Jomar Juan RN  Outcome: Ongoing     Problem: SAFETY  Goal: Free from intentional harm  4/23/2021 1157 by Jomar Juan RN  Outcome: Ongoing     Problem: DAILY CARE  Goal: Daily care needs are met  4/23/2021 1157 by Jomar Juan RN  Outcome: Ongoing     Problem: PAIN  Goal: Patient's pain/discomfort is manageable  4/23/2021 1157 by Jomar Juan RN  Outcome: Ongoing     Problem: SKIN INTEGRITY  Goal: Skin integrity is maintained or improved  4/23/2021 1157 by Jomar Juan RN  Outcome: Ongoing     Problem: KNOWLEDGE DEFICIT  Goal: Patient/S.O. demonstrates understanding of disease process, treatment plan, medications, and discharge instructions.   4/23/2021 1157 by Jomar Juan RN  Outcome: Ongoing     Problem: DISCHARGE BARRIERS  Goal: Patient's continuum of care needs are met  4/23/2021 1157 by Jomar Juan RN  Outcome: Ongoing     Problem: Falls - Risk of:  Goal: Will remain free from falls  Description: Will remain free from falls  4/23/2021 1157 by Jomar Juna RN  Outcome: Ongoing     Problem: Falls - Risk of:  Goal: Absence of physical injury  Description: Absence of physical injury  4/23/2021 1157 by Jomar Juan RN  Outcome: Ongoing     Problem: HEMODYNAMIC STATUS  Goal: Patient has stable vital signs and fluid balance  4/23/2021 1157 by Jomar Juan RN  Outcome: Ongoing   Electronically signed by Kaylah Turner RN on 4/23/2021 at 11:58 AM

## 2021-04-23 NOTE — PLAN OF CARE
Problem: SAFETY  Goal: Free from accidental physical injury  4/23/2021 1155 by James Boone RN  Outcome: Ongoing     Problem: SAFETY  Goal: Free from intentional harm  4/23/2021 1155 by James Boone RN  Outcome: Ongoing     Problem: DAILY CARE  Goal: Daily care needs are met  4/23/2021 1155 by James Boone RN  Outcome: Ongoing     Problem: PAIN  Goal: Patient's pain/discomfort is manageable  4/23/2021 1155 by James Boone RN  Outcome: Ongoing     Problem: SKIN INTEGRITY  Goal: Skin integrity is maintained or improved  4/23/2021 1155 by James Boone RN  Outcome: Ongoing     Problem: KNOWLEDGE DEFICIT  Goal: Patient/S.O. demonstrates understanding of disease process, treatment plan, medications, and discharge instructions.   4/23/2021 1155 by James Boone RN  Outcome: Ongoing     Problem: DISCHARGE BARRIERS  Goal: Patient's continuum of care needs are met  4/23/2021 1155 by James Boone RN  Outcome: Ongoing     Problem: Falls - Risk of:  Goal: Will remain free from falls  Description: Will remain free from falls  4/23/2021 1155 by James Boone RN  Outcome: Ongoing     Problem: Falls - Risk of:  Goal: Absence of physical injury  Description: Absence of physical injury  4/23/2021 1155 by James Boone RN  Outcome: Ongoing     Problem: HEMODYNAMIC STATUS  Goal: Patient has stable vital signs and fluid balance  4/23/2021 1155 by James Boone RN  Outcome: Ongoing   Electronically signed by Sara Lai RN on 4/23/2021 at 11:56 AM

## 2021-04-23 NOTE — CARE COORDINATION
PT/OT recommending Continue to assess pending progress, Home with assist PRN  OT Equipment Recommendations  Equipment Needed: No    PLAN: Patient to return home with spouse at discharge. No anticipated needs.      TIFFANY Polk, JM, Social Work/Case Management   [phone]  Electronically signed by TIFFANY Polk LSW on 4/23/2021 at 4:15 PM

## 2021-04-23 NOTE — PROGRESS NOTES
Neuro: Moves all four extremities. CN 2-12 tested, no defect noted. Psych: Oriented x 3. No anxiety. Awake. Alert. Intact judgement and insight. Data    LABS  CBC:   Recent Labs     04/22/21  0003 04/23/21  0712   WBC 7.1 6.5   HGB 12.3 12.4   HCT 36.3 37.6   MCV 93.2 94.4    274     BMP:   Recent Labs     04/22/21 0003      K 4.1      CO2 25   BUN 11   CREATININE 0.6   GLUCOSE 121*     POC GLUCOSE:  No results for input(s): POCGLU in the last 72 hours. LIVER PROFILE:   Recent Labs     04/22/21 0003   AST 18   ALT 8*   LABALBU 4.0   BILITOT 0.3   ALKPHOS 84     PT/INR:   Recent Labs     04/22/21 0003   PROTIME 11.6   INR 1.00     APTT: No results for input(s): APTT in the last 72 hours. UA:  Recent Labs     04/21/21 0003   COLORU YELLOW   PHUR 8.5*   WBCUA 1   RBCUA 1   CLARITYU CLOUDY*   SPECGRAV 1.026   LEUKOCYTESUR Negative   UROBILINOGEN 0.2   BILIRUBINUR Negative   BLOODU Negative   GLUCOSEU Negative   KETUA Negative     Microbiology:  Wound Culture: No results for input(s): WNDABS, ORG in the last 72 hours. Invalid input(s):  LABGRAM  Nasal Culture: No results for input(s): ORG, MRSAPCR in the last 72 hours. Blood Culture: No results for input(s): BC, BLOODCULT2 in the last 72 hours. Fungal Culture:   No results for input(s): FUNGSM in the last 72 hours. No results for input(s): FUNCXBLD in the last 72 hours. CSF Culture:  No results for input(s): COLORCSF, APPEARCSF, CFTUBE, CLOTCSF, WBCCSF, RBCCSF, NEUTCSF, NUMCELLSCSF, LYMPHSCSF, MONOCSF, GLUCCSF, VOLCSF in the last 72 hours. Respiratory Culture:  No results for input(s): Doc Aguero in the last 72 hours. AFB:No results for input(s): AFBSMEAR in the last 72 hours. Urine Culture  No results for input(s): LABURIN in the last 72 hours. RADIOLOGY:    MRI brain without contrast   Final Result   1. No acute intracranial abnormality. No acute infarct. 2. Mild-to-moderate chronic microvascular ischemic changes. XR CHEST PORTABLE   Final Result   No evidence of acute cardiopulmonary disease         CTA HEAD NECK W CONTRAST   Final Result   1. Bilateral carotid bifurcation atherosclerotic plaque and calcification   resulting approximately 50% stenosis of the origin of the bilateral internal   carotid arteries. Finding is compatible with 50-69% stenosis per sonographic   NASCET index criteria. 2. Left subclavian artery origin approximately 60% stenosis secondary to   encroachment by atherosclerotic plaque. 3. Distal right internal carotid artery cervical segment irregular contour   suggesting association with fibromuscular dysplasia (FMD). CT HEAD WO CONTRAST   Final Result   Moderate chronic microischemic disease scattered in the deep white matter   with no acute intracranial abnormality seen. The findings were called to and Dr. Kevan Goldman at 11:30 p.m. CONSULTS:    IP CONSULT TO PHARMACY  PHARMACY TO CHANGE BASE FLUIDS  IP CONSULT TO NEUROLOGY    ASSESSMENT AND PLAN:      Active Problems:    Dizziness  Resolved Problems:    * No resolved hospital problems. *    Patient is a 70-year-old female with a past medical history of rheumatic heart disease, hypothyroidism, rheumatic fever who presented with sudden onset of dizziness and vertigo. CTA of the head and neck showed some bilateral internal carotid stenosis about 50 to 70% with left subclavian stenosis. There also was calcified left vertebral artery hence the stroke team recommended that the patient get evaluated for central causes of vertigo    Vertigo -   MRI brain no acute CVA. I am concerned about labyrinthitis/vestibulitis as the cause of her symptoms. + otitis externa on exam.   + Hx of Oral HSV  Neurology consulted in the ED  -cont supportive Antivert and Valium  - add debrox, ciprodex topical to R ear. - add prednisone and acyclovir for vestibulitis/labyrinthitis.        Atherosclerosis bilateral carotid arteries with left subclavian stenosis  -On aspirin and statin      Chronic pain  -Resume home Percocet      Hypothyroidism  -Continue levothyroxine          Diet: DIET GENERAL;  Code Status: Full Code        Discharge plan - monitor BG response to steroid - plan to discharge home tomorrow if BG ok.        Kaye Nuno MD

## 2021-04-24 VITALS
WEIGHT: 161.82 LBS | HEIGHT: 63 IN | HEART RATE: 76 BPM | DIASTOLIC BLOOD PRESSURE: 79 MMHG | OXYGEN SATURATION: 93 % | TEMPERATURE: 97.7 F | BODY MASS INDEX: 28.67 KG/M2 | RESPIRATION RATE: 18 BRPM | SYSTOLIC BLOOD PRESSURE: 144 MMHG

## 2021-04-24 LAB
ALBUMIN SERPL-MCNC: 4.2 G/DL (ref 3.4–5)
ANION GAP SERPL CALCULATED.3IONS-SCNC: 9 MMOL/L (ref 3–16)
BASOPHILS ABSOLUTE: 0 K/UL (ref 0–0.2)
BASOPHILS RELATIVE PERCENT: 0.6 %
BUN BLDV-MCNC: 10 MG/DL (ref 7–20)
CALCIUM SERPL-MCNC: 8.9 MG/DL (ref 8.3–10.6)
CHLORIDE BLD-SCNC: 109 MMOL/L (ref 99–110)
CO2: 25 MMOL/L (ref 21–32)
CREAT SERPL-MCNC: 0.5 MG/DL (ref 0.6–1.2)
EOSINOPHILS ABSOLUTE: 0 K/UL (ref 0–0.6)
EOSINOPHILS RELATIVE PERCENT: 0 %
GFR AFRICAN AMERICAN: >60
GFR NON-AFRICAN AMERICAN: >60
GLUCOSE BLD-MCNC: 127 MG/DL (ref 70–99)
HCT VFR BLD CALC: 37.6 % (ref 36–48)
HEMOGLOBIN: 12.6 G/DL (ref 12–16)
L. PNEUMOPHILA SEROGP 1 UR AG: NORMAL
LYMPHOCYTES ABSOLUTE: 1.4 K/UL (ref 1–5.1)
LYMPHOCYTES RELATIVE PERCENT: 19.4 %
MCH RBC QN AUTO: 31.5 PG (ref 26–34)
MCHC RBC AUTO-ENTMCNC: 33.5 G/DL (ref 31–36)
MCV RBC AUTO: 93.8 FL (ref 80–100)
MONOCYTES ABSOLUTE: 0.2 K/UL (ref 0–1.3)
MONOCYTES RELATIVE PERCENT: 2.3 %
NEUTROPHILS ABSOLUTE: 5.5 K/UL (ref 1.7–7.7)
NEUTROPHILS RELATIVE PERCENT: 77.7 %
PDW BLD-RTO: 13.5 % (ref 12.4–15.4)
PHOSPHORUS: 3.5 MG/DL (ref 2.5–4.9)
PLATELET # BLD: 293 K/UL (ref 135–450)
PMV BLD AUTO: 7.9 FL (ref 5–10.5)
POTASSIUM SERPL-SCNC: 3.8 MMOL/L (ref 3.5–5.1)
RBC # BLD: 4.01 M/UL (ref 4–5.2)
SODIUM BLD-SCNC: 143 MMOL/L (ref 136–145)
STREP PNEUMONIAE ANTIGEN, URINE: NORMAL
WBC # BLD: 7.1 K/UL (ref 4–11)

## 2021-04-24 PROCEDURE — 2580000003 HC RX 258: Performed by: NURSE PRACTITIONER

## 2021-04-24 PROCEDURE — 94760 N-INVAS EAR/PLS OXIMETRY 1: CPT

## 2021-04-24 PROCEDURE — 6360000002 HC RX W HCPCS: Performed by: NURSE PRACTITIONER

## 2021-04-24 PROCEDURE — 85025 COMPLETE CBC W/AUTO DIFF WBC: CPT

## 2021-04-24 PROCEDURE — 6370000000 HC RX 637 (ALT 250 FOR IP): Performed by: NURSE PRACTITIONER

## 2021-04-24 PROCEDURE — 80069 RENAL FUNCTION PANEL: CPT

## 2021-04-24 PROCEDURE — 36415 COLL VENOUS BLD VENIPUNCTURE: CPT

## 2021-04-24 PROCEDURE — 6370000000 HC RX 637 (ALT 250 FOR IP): Performed by: INTERNAL MEDICINE

## 2021-04-24 RX ORDER — MECLIZINE HCL 12.5 MG/1
12.5 TABLET ORAL 3 TIMES DAILY PRN
Qty: 30 TABLET | Refills: 0 | Status: SHIPPED | OUTPATIENT
Start: 2021-04-24 | End: 2021-05-04

## 2021-04-24 RX ORDER — PREDNISONE 20 MG/1
40 TABLET ORAL DAILY
Qty: 10 TABLET | Refills: 0 | Status: SHIPPED | OUTPATIENT
Start: 2021-04-25 | End: 2021-04-30

## 2021-04-24 RX ORDER — OFLOXACIN 3 MG/ML
3 SOLUTION/ DROPS OPHTHALMIC 4 TIMES DAILY
Qty: 1 BOTTLE | Refills: 0 | Status: SHIPPED | OUTPATIENT
Start: 2021-04-24 | End: 2021-04-29

## 2021-04-24 RX ORDER — ACYCLOVIR 200 MG/1
400 CAPSULE ORAL 4 TIMES DAILY
Qty: 72 CAPSULE | Refills: 0 | Status: SHIPPED | OUTPATIENT
Start: 2021-04-24 | End: 2021-05-03

## 2021-04-24 RX ADMIN — ACYCLOVIR 400 MG: 200 CAPSULE ORAL at 08:06

## 2021-04-24 RX ADMIN — CARBAMIDE PEROXIDE 6.5% 5 DROP: 6.5 LIQUID AURICULAR (OTIC) at 06:19

## 2021-04-24 RX ADMIN — Medication 50 MCG: at 08:06

## 2021-04-24 RX ADMIN — DEXAMETHASONE SODIUM PHOSPHATE 4 DROP: 1 SOLUTION/ DROPS OPHTHALMIC at 08:06

## 2021-04-24 RX ADMIN — PREDNISONE 40 MG: 20 TABLET ORAL at 08:06

## 2021-04-24 RX ADMIN — ASPIRIN 81 MG: 81 TABLET, COATED ORAL at 08:06

## 2021-04-24 RX ADMIN — ENOXAPARIN SODIUM 40 MG: 40 INJECTION SUBCUTANEOUS at 08:06

## 2021-04-24 RX ADMIN — THERA TABS 1 TABLET: TAB at 08:06

## 2021-04-24 RX ADMIN — SODIUM CHLORIDE, PRESERVATIVE FREE 10 ML: 5 INJECTION INTRAVENOUS at 08:06

## 2021-04-24 RX ADMIN — CIPROFLOXACIN 4 DROP: 3 SOLUTION OPHTHALMIC at 08:06

## 2021-04-24 RX ADMIN — FOLIC ACID 1 MG: 1 TABLET ORAL at 08:06

## 2021-04-24 RX ADMIN — ACETAMINOPHEN 650 MG: 325 TABLET ORAL at 03:24

## 2021-04-24 RX ADMIN — CARBAMIDE PEROXIDE 6.5% 5 DROP: 6.5 LIQUID AURICULAR (OTIC) at 00:16

## 2021-04-24 RX ADMIN — LEVOTHYROXINE SODIUM 112 MCG: 0.11 TABLET ORAL at 06:20

## 2021-04-24 RX ADMIN — CALCIUM 500 MG: 500 TABLET ORAL at 08:06

## 2021-04-24 ASSESSMENT — PAIN DESCRIPTION - ONSET: ONSET: ON-GOING

## 2021-04-24 ASSESSMENT — PAIN - FUNCTIONAL ASSESSMENT: PAIN_FUNCTIONAL_ASSESSMENT: PREVENTS OR INTERFERES SOME ACTIVE ACTIVITIES AND ADLS

## 2021-04-24 ASSESSMENT — PAIN DESCRIPTION - LOCATION: LOCATION: HEAD;NECK

## 2021-04-24 ASSESSMENT — PAIN SCALES - GENERAL
PAINLEVEL_OUTOF10: 8
PAINLEVEL_OUTOF10: 4
PAINLEVEL_OUTOF10: 4

## 2021-04-24 ASSESSMENT — PAIN DESCRIPTION - PROGRESSION: CLINICAL_PROGRESSION: GRADUALLY WORSENING

## 2021-04-24 NOTE — PROGRESS NOTES
Physical Therapy  Attempt Note    Patient Name: Za Vallejo   Patient : 1950  MRN: [id number]   Room Number: L2K-7612/3073-59      I went to patient's bedside to attempt treatment today however she was out of her room for a procedure. Will attempt again later today as schedule permits. This note also serves as a D/C Summary in the event that this patient is discharged prior to the next therapy session. Please refer to last PT note for goal status, discharge recommendations and functional status.           Armando Marino, PT

## 2021-04-24 NOTE — PLAN OF CARE
Problem: SAFETY  Goal: Free from accidental physical injury  Outcome: Ongoing  Goal: Free from intentional harm  Outcome: Ongoing     Problem: DAILY CARE  Goal: Daily care needs are met  Outcome: Ongoing     Problem: PAIN  Goal: Patient's pain/discomfort is manageable  Outcome: Ongoing     Problem: SKIN INTEGRITY  Goal: Skin integrity is maintained or improved  Outcome: Ongoing     Problem: KNOWLEDGE DEFICIT  Goal: Patient/S.O. demonstrates understanding of disease process, treatment plan, medications, and discharge instructions.   Outcome: Ongoing     Problem: DISCHARGE BARRIERS  Goal: Patient's continuum of care needs are met  Outcome: Ongoing     Problem: Falls - Risk of:  Goal: Will remain free from falls  Description: Will remain free from falls  Outcome: Ongoing  Goal: Absence of physical injury  Description: Absence of physical injury  Outcome: Ongoing     Problem: HEMODYNAMIC STATUS  Goal: Patient has stable vital signs and fluid balance  Outcome: Ongoing     Problem: ACTIVITY INTOLERANCE/IMPAIRED MOBILITY  Goal: Mobility/activity is maintained at optimum level for patient  Outcome: Ongoing     Problem: COMMUNICATION IMPAIRMENT  Goal: Ability to express needs and understand communication  Outcome: Ongoing     Problem: Pain:  Goal: Pain level will decrease  Description: Pain level will decrease  Outcome: Ongoing  Goal: Control of acute pain  Description: Control of acute pain  Outcome: Ongoing  Goal: Control of chronic pain  Description: Control of chronic pain  Outcome: Ongoing

## 2021-04-24 NOTE — DISCHARGE SUMMARY
Hospital Medicine Discharge Summary    Patient ID: Nanette Butterfield      Patient's PCP: Abe Griffiths    Admit Date: 4/21/2021     Discharge Date: 4/24/2021      Admitting Physician: Ryan Powell DO     Discharge Physician: Trinh Castelan MD     Discharge Diagnoses: Active Hospital Problems    Diagnosis    Dizziness [R42]       The patient was seen and examined on day of discharge and this discharge summary is in conjunction with any daily progress note from day of discharge. Hospital Course: 75yo woman presented with R ear discomfort and abrupt onset severe vertigo symptoms. Vertigo -   MRI brain no acute CVA. I am concerned about labyrinthitis/vestibulitis as the primary cause of her symptoms. + otitis externa on exam on the right side.   + Hx of Oral HSV  Neurology consulted in the ED  - cont supportive Antivert and Valium  - add debrox, ciprodex topical to R ear - will cont on discharge.               - added prednisone and acyclovir for vestibulitis/labyrinthitis.           Atherosclerosis bilateral carotid arteries with left subclavian stenosis  -On aspirin and statin          Chronic pain  -Resume home Percocet          Hypothyroidism  -Continue levothyroxine              Physical Exam Performed:     BP (!) 144/79   Pulse 76   Temp 97.7 °F (36.5 °C) (Oral)   Resp 18   Ht 5' 2.5\" (1.588 m)   Wt 161 lb 13.1 oz (73.4 kg)   SpO2 93%   BMI 29.13 kg/m²       General appearance:  No apparent distress, appears stated age and cooperative. HEENT:  Normal cephalic, atraumatic without obvious deformity. Pupils equal, round, and reactive to light. Extra ocular muscles intact. Conjunctivae/corneas clear. Neck: Supple, with full range of motion. No jugular venous distention. Trachea midline. Respiratory:  Normal respiratory effort. Clear to auscultation, bilaterally without Rales/Wheezes/Rhonchi.   Cardiovascular:  Regular rate and rhythm with normal S1/S2 without murmurs, rubs or gallops. Abdomen: Soft, non-tender, non-distended with normal bowel sounds. Musculoskeletal:  No clubbing, cyanosis or edema bilaterally. Full range of motion without deformity. Skin: Skin color, texture, turgor normal.  No rashes or lesions. Neurologic:  Neurovascularly intact without any focal sensory/motor deficits. Cranial nerves: II-XII intact, grossly non-focal.  Psychiatric:  Alert and oriented, thought content appropriate, normal insight  Capillary Refill: Brisk,< 3 seconds   Peripheral Pulses: +2 palpable, equal bilaterally       Labs: For convenience and continuity at follow-up the following most recent labs are provided:      CBC:    Lab Results   Component Value Date    WBC 7.1 04/24/2021    HGB 12.6 04/24/2021    HCT 37.6 04/24/2021     04/24/2021       Renal:    Lab Results   Component Value Date     04/24/2021    K 3.8 04/24/2021    K 4.1 04/22/2021     04/24/2021    CO2 25 04/24/2021    BUN 10 04/24/2021    CREATININE 0.5 04/24/2021    CALCIUM 8.9 04/24/2021    PHOS 3.5 04/24/2021         Significant Diagnostic Studies    Radiology:   MRI brain without contrast   Final Result   1. No acute intracranial abnormality. No acute infarct. 2. Mild-to-moderate chronic microvascular ischemic changes. XR CHEST PORTABLE   Final Result   No evidence of acute cardiopulmonary disease         CTA HEAD NECK W CONTRAST   Final Result   1. Bilateral carotid bifurcation atherosclerotic plaque and calcification   resulting approximately 50% stenosis of the origin of the bilateral internal   carotid arteries. Finding is compatible with 50-69% stenosis per sonographic   NASCET index criteria. 2. Left subclavian artery origin approximately 60% stenosis secondary to   encroachment by atherosclerotic plaque. 3. Distal right internal carotid artery cervical segment irregular contour   suggesting association with fibromuscular dysplasia (FMD).          CT HEAD WO CONTRAST   Final Result   Moderate chronic microischemic disease scattered in the deep white matter   with no acute intracranial abnormality seen. The findings were called to and Dr. Meghana Wise at 11:30 p.m. Consults:     IP CONSULT TO PHARMACY  PHARMACY TO CHANGE BASE FLUIDS  IP CONSULT TO NEUROLOGY    Disposition:  home    Condition at Discharge: Stable    Discharge Instructions/Follow-up:  PCP 1 week. Gave ENT referral.     Code Status:  Full Code     Activity: activity as tolerated    Diet: regular diet      Discharge Medications:     Discharge Medication List as of 4/24/2021 11:06 AM           Details   meclizine (ANTIVERT) 12.5 MG tablet Take 1 tablet by mouth 3 times daily as needed for Dizziness, Disp-30 tablet, R-0Print      acyclovir (ZOVIRAX) 200 MG capsule Take 2 capsules by mouth 4 times daily for 9 days, Disp-72 capsule, R-0Print      predniSONE (DELTASONE) 20 MG tablet Take 2 tablets by mouth daily for 5 days, Disp-10 tablet, R-0Print      ofloxacin (OCUFLOX) 0.3 % solution Place 3 drops in ear(s) 4 times daily for 5 days Use for Right ear., Disp-1 Bottle, R-0Print      carbamide peroxide (DEBROX) 6.5 % otic solution Place 5 drops into both ears 2 times daily for 5 days, Disp-1 Bottle, R-1Print              Details   oxyCODONE (ROXICODONE) 5 MG immediate release tablet Take 5 mg by mouth every 8 hours as needed for Pain. Historical Med      aspirin 81 MG tablet Take 81 mg by mouth every evening Historical Med      calcium carbonate (OSCAL) 500 MG TABS tablet Take 500 mg by mouth dailyHistorical Med      Multiple Vitamin (ONE-A-DAY ESSENTIAL) TABS Take 1 tablet by mouth dailyHistorical Med      vitamin D3 (CHOLECALCIFEROL) 25 MCG (1000 UT) TABS tablet Take 2,000 Units by mouth every evening Historical Med      vitamin B-12 (CYANOCOBALAMIN) 100 MCG tablet Take 50 mcg by mouth dailyHistorical Med      L-Methylfolate-Algae-B12-B6 (METANX PO) Take 1 tablet by mouth dailyHistorical Med      folic acid (FOLVITE) 1 MG tablet Take 1 mg by mouth every evening Historical Med      levothyroxine (SYNTHROID) 112 MCG tablet Take 112 mcg by mouth DailyHistorical Med             Time Spent on discharge is more than 30 minutes in the examination, evaluation, counseling and review of medications and discharge plan. Signed:    Sandrita Schaeffer MD   4/24/2021      Thank you Chapito Aguilar for the opportunity to be involved in this patient's care. If you have any questions or concerns please feel free to contact me at [phone].

## 2021-04-24 NOTE — PLAN OF CARE
Kinsey Powell RN  Outcome: Ongoing     Problem: Pain:  Goal: Control of chronic pain  Description: Control of chronic pain  4/24/2021 0950 by Kinsey Powell RN  Outcome: Ongoing

## 2021-04-24 NOTE — PROGRESS NOTES
Discharge paperwork reviewed with patient and spouse. Patient educated on the 5 printed prescriptions that need to be filled. IV removed without complication. Heart monitor removed and returned to [address]. Patient verbalized understanding of discharge paperwork and medication review. Patient follow up outpatient therapy. Patient to be transported home with belongings by .

## 2021-05-06 DIAGNOSIS — H91.90 HEARING LOSS, UNSPECIFIED HEARING LOSS TYPE, UNSPECIFIED LATERALITY: Primary | ICD-10-CM

## 2021-05-12 ENCOUNTER — HOSPITAL ENCOUNTER (OUTPATIENT)
Dept: PHYSICAL THERAPY | Age: 71
Setting detail: THERAPIES SERIES
Discharge: HOME | End: 2021-05-12
Payer: MEDICARE

## 2021-05-12 PROCEDURE — 97112 NEUROMUSCULAR REEDUCATION: CPT

## 2021-05-12 PROCEDURE — 97161 PT EVAL LOW COMPLEX 20 MIN: CPT

## 2021-05-12 PROCEDURE — 97530 THERAPEUTIC ACTIVITIES: CPT

## 2021-05-12 NOTE — FLOWSHEET NOTE
[place] [facility], [facility]  [address] [facility], [facility]  Phone: [phone]   Fax:     [phone]      Physical Therapy Treatment Note/ Progress Report:   Date:  2021    Patient Name:  Lizzie Minor    :  1950  MRN: [id number]    Pertinent Medical History:  OP, RA, B RTC repair, h/o 6 cervical surgeries    Medical/Treatment Diagnosis Information:  · Diagnosis: Acute severe vertigo  · Treatment Diagnosis: Vestibular Impairment    Insurance/Certification information:  PT Insurance Information: Medicare  Physician Information:  Referring Practitioner: Kaye Nuno MD / Franklin Sosa MD  Plan of care signed (Y/N): []  Yes [x]  No     Date of Patient follow up with Physician:      Progress Report: []  Yes  [x]  No     Date Range for reporting period:  Beginnin2021  Ending:     Progress report due (10 Rx/or 30 days whichever is less): 3/54/27    Recertification due (POC duration/ or 90 days whichever is less):  21    Visit # Insurance Allowable Auth required? 1 BOMN []  Yes [x]  No     Latex Allergy:  [x]NO      []YES  Preferred Language for Healthcare:   [x]English       []other:    Functional Outcomes Measure:   Date Assessed:  Test: DHI  Score: 52    Pain level:  0/10   Dizziness level: 1-2/10    History of Injury:Patient reports that about 2 weeks ago she went to lay down in bed due to stomach not feeling well. When turning her head in bed to get a pillow she had severe episode of vertigo. Patient stated the whole room was spinning. This lasted for about 8 hours where she laid in bed with covers over her head. Finally went to the ER to be evaluated. Patient was admitted for 4 days to the ER. MRI of the brain was negative for CVA. Patient was later discharged to home.   Patient reports that the dizziness is better but if she turns her head to the right or rolls on her right side when laying down she gets severe vertigo. Patient was given antibiotics and steroids while in the hospital.     SUBJECTIVE:  See eval    OBJECTIVE:   Postural Control Tests:  Clinical Test of Sensory Interaction for Balance (CTSIB) performed in Romberg stance  CONDITION TIME STRATEGY SWAY    Eyes open, firm surface x30 sec   none    Eyes closed, firm surface x30 sec ankle Very slight    Eyes open, foam surface x30 sec ankle mild    Eyes closed, foam surface x30 sec  Hip  moderate      Gait: No major deviations noted              Oculomotor/Vestibular Examination:     Spontaneous nystagmus:       []? Left             []? Right           [x]? Absent  Gaze-Evoked nystagmus with fixation present:              Primary            []? Present       [x]? Absent              Right                [x]? Present       []? Absent              Left                  [x]? Present       []? Absent  VOR Head Thrust Test:          [x]? Normal       []? Abnormal    Comments: fast beat to the right  VOR Cancellation:                  []? Normal       []? Abnormal    Comments:   Smooth Pursuit:                      []? Normal       [x]? Abnormal    Comments: fast beat to the right  Saccades:                               []? Normal       [x]? Abnormal    Comments: fast beat to the right  Convergence:                          [x]? Normal       []? Abnormal    Comments:      Positional Testing  R Hallpike-Kylie maneuver:               Nystagmus:     [x]? Yes             []? No               [x]? Duration: 30 secs                                         [x]? Direction: upbeating to the right                  Vertigo:            [x]? Yes             []? No               []? Duration:   L Hallpike-Hempstead maneuver:              Nystagmus:     []? Yes             [x]? No               []? Duration:                                          []? Direction:                  Vertigo:            []? Yes             [x]?  No               []? handout. Therapeutic Exercise and NMR EXR  [] (55856) Provided verbal/tactile cueing for activities related to strengthening, flexibility, endurance, ROM for improvements in LE, proximal hip, and core control with self care, mobility, lifting, ambulation. [x] (16903) Provided verbal/tactile cueing for activities related to improving balance, coordination, kinesthetic sense, posture, motor skill, proprioception  to assist with LE, proximal hip, and core control in self care, mobility, lifting, ambulation and eccentric single leg control.  [address] and Therapeutic Activities:    [x] (36427 or 14183) Provided verbal/tactile cueing for activities related to improving balance, coordination, kinesthetic sense, posture, motor skill, proprioception and motor activation to allow for proper function of core, proximal hip and LE with self care and ADLs and functional mobility.   [] (57251) Gait Re-education- Provided training and instruction to the patient for proper LE, core and proximal hip recruitment and positioning and eccentric body weight control with ambulation re-education including up and down stairs     Home Exercise Program:    [x] (08174) Reviewed/Progressed HEP activities related to strengthening, flexibility, endurance, ROM of core, proximal hip and LE for functional self-care, mobility, lifting and ambulation/stair navigation   [] (17150)Reviewed/Progressed HEP activities related to improving balance, coordination, kinesthetic sense, posture, motor skill, proprioception of core, proximal hip and LE for self care, mobility, lifting, and ambulation/stair navigation      Manual Treatments:  PROM / STM / Oscillations-Mobs:  G-I, II, III, IV (PA's, Inf., Post.)  [] (69696) Provided manual therapy to mobilize LE, proximal hip and/or LS spine soft tissue/joints for the purpose of modulating pain, promoting relaxation,  increasing ROM, reducing/eliminating soft tissue swelling/inflammation/restriction,    ASSESSMENT:  See eval    Treatment/Activity Tolerance:  [x] Patient tolerated treatment well [] Patient limited by fatique  [] Patient limited by pain  [] Patient limited by other medical complications  [] Other:     Overall Progression Towards Functional goals/ Treatment Progress Update:  [] Patient is progressing as expected towards functional goals listed. [] Progression is slowed due to complexities/Impairments listed. [] Progression has been slowed due to co-morbidities. [x] Plan just implemented, too soon to assess goals progression <30days   [] Goals require adjustment due to lack of progress  [] Patient is not progressing as expected and requires additional follow up with physician  [] Other    Prognosis for POC: [x] Good [] Fair  [] Poor    Patient requires continued skilled intervention: [x] Yes  [] No        PLAN: Reassess positional testing  [] Continue per plan of care [] Alter current plan (see comments)  [x] Plan of care initiated [] Hold pending MD visit [] Discharge    Electronically signed by: Kevin Wolf PT , OMT-C,  [id number]    Note: If patient does not return for scheduled/recommended follow up visits, this note will serve as a discharge from care along with the most recent update on progress.

## 2021-05-12 NOTE — PLAN OF CARE
[address], [facility]  [address] [facility]  Phone: [phone]   Fax:     [phone]                                                       Physical Therapy Certification    Dear Referring Practitioner: Salma Arias MD / Darrell Mcmahon MD    We had the pleasure of evaluating the following patient for physical therapy services at [address]. A summary of our findings can be found in the initial assessment below. This includes our plan of care. If you have any questions or concerns regarding these findings, please do not hesitate to contact me at the office phone number checked above. Thank you for the referral.       Physician Signature:_______________________________Date:__________________  By signing above (or electronic signature), therapists plan is approved by physician          Patient: Preeti Merritt   : 1950   MRN: [id number]  Referring Physician: Referring Practitioner: Salma Arias MD / Darrell Mcmahon MD      Evaluation Date: 2021        Medical Diagnosis Information:  Diagnosis: Acute severe vertigo   Treatment Diagnosis: Vestibular Impairment                                           Insurance information: PT Insurance Information: Medicare     Precautions/ Contra-indications: none  Latex Allergy:  [x]NO      []YES  Preferred Language for Healthcare:   [x]English       []other:    C-SSRS Triggered by Intake questionnaire (Past 2 wk assessment ):   [x] No, Questionnaire did not trigger screening.   [] Yes, Patient intake triggered C-SSRS Screening      [] C-SSRS Screening completed  [] PCP notified via Epic     SUBJECTIVE: Patient stated complaint:  Patient reports that about 2 weeks ago she went to lay down in bed due to stomach not feeling well. When turning her head in bed to get a pillow she had severe episode of vertigo. Patient stated the whole room was spinning. This lasted for about 8 hours where she laid in bed with covers over her head. Finally went to the ER to be evaluated. Patient was admitted for 4 days to the ER. MRI of the brain was negative for CVA. Patient was later discharged to home. Patient reports that the dizziness is better but if she turns her head to the right or rolls on her right side when laying down she gets severe vertigo. Patient was given antibiotics and steroids while in the hospital.     Relevant Medical History:  OP, RA, B RTC repair, h/o 6 cervical surgeries  Pain Scale: 0/10     Type: []Constant   [x]Intermittent  []Radiating []Localized []other:    Dizziness Scale: 1-2/10    Description of symptoms: [x] Vertigo []  Off-balance [] Lightheadedness    Symptoms are getting:  [x] Better [] Worse  [] Same      [] Episodic    Description of Spells: [] Constant [] Spontaneous [] Motion Induced [x] Induced by position changes [] Other:    Length of time spells occur: [] Seconds [x] Minutes [] Hours [] Days [] Other:     Date of onset: 2 weeks ago    Setting in which symptoms first occurred: see above    What increases/provokes symptoms? Turning head to the right and rolling onto the right side    What decreases/eases symptoms?  Not moving     Hearing impairments:  [] Yes  [x] No  [] Other:     Hearing changes since onset:  [] Yes  [x] No  [] Other:    Visual changes since onset: [] Yes  [x] No  [] Other:    Recent falls:    [] Yes  [x] No     Comments: has had some loss of balance     History of migraines/HA:  [] Yes  [x] No    Comments:    Previous treatments: antibiotics and steroids in the hospital    Job requirements/work status: retired    Occupation/School: use to work as a     Living Status/Prior Level of Function: Prior to this injury / incident, patient was independent with ADLs and IADLs     OBJECTIVE:     Musculoskeletal Screen  Cervical spine complaints: pt reports she is suppose to have surgery in a few weeks on cspine for Direction:    Vertigo:  [] Yes  [x] No  [] Duration:     Outcome Measures  Dizziness Handicap Index ([facility]) 52     [x] Patient history, allergies, meds reviewed. Medical chart reviewed. See intake form. Review of Systems (ROS):  [x]Performed Review of systems (Integumentary, Cardiopulmonary, Neurological) by intake and observation. Intake form has been scanned into medical record. Patient has been instructed to contact their primary care physician regarding ROS issues if not already being addressed at this time.       Co-morbidities/Complexities (which will affect course of rehabilitation):  []None           Arthritic conditions   [x]Rheumatoid arthritis (M05.9)  []Osteoarthritis (M19.91)   Cardiovascular conditions   []Hypertension (I10)  []Hyperlipidemia (E78.5)  []Angina pectoris (I20)  []Atherosclerosis (I70)   Musculoskeletal conditions   []Disc pathology   []Congenital spine pathologies   [x]Prior surgical intervention  []Osteoporosis (M81.8)  []Osteopenia (M85.8)   Endocrine conditions   []Hypothyroid (E03.9)  []Hyperthyroid Gastrointestinal conditions   []Constipation (B77.92)   Metabolic conditions   []Morbid obesity (E66.01)  []Diabetes type 1(E10.65) or 2 (E11.65)   []Neuropathy (G60.9)     Pulmonary conditions   []Asthma (J45)  []Coughing   []COPD (J44.9)   Psychological Disorders  []Anxiety (F41.9)  []Depression (F32.9)   []Other:   []Other:           Barriers to/and or personal factors that will affect rehab potential:              []Age  []Sex    []Smoker              []Motivation/Lack of Motivation                        [x]Co-Morbidities              []Cognitive Function, education/learning barriers              []Environmental, home barriers              []profession/work barriers  []past PT/medical experience  []other:  Justification:     ASSESSMENT:      Functional Impairments:  Problem List/Functional Limitations:   [x] BPPV    [x] Right [x] Posterior Canal [x] Canalithiasis    [] Left  [] Horizontal Canal [] Cupulolithiasis   [] Decreased Gaze Stabilization    [] Increased Motion Sensitivity   [] Unilateral Vestibular Hypofunction [] Bilateral Vestibular Hypofunction   [x] Gait Instability    [] Decreased tolerance for ADLs    [] Decreased functional strength   [x] Reduced Balance/Proprioceptive control   [] Reduced ability to hear/focus   [] Noted cervical/thoracic/GHJ joint hypomobility   [] Noted cervical/thoracic/GHJ joint hypermobility   [] Decreased cervical/UE functional ROM   [] Noted Headache pain aggravated by neck movements with/without dizziness   [] Abnormal reflexes/sensation/myotomal/dermatomal deficits   [] Decreased DCF control or ability to hold head up   [] Decreased RC/scapular/core strength and neuromuscular control     Functional Activity Limitations (from functional questionnaire and intake)   []Reduced ability to tolerate prolonged functional positions   [x]Reduced ability or difficulty with changes of positions or transfers between positions  [x]Reduced ability to transfer in/out of bed or rolling in bed   []Reduced ability or tolerance with driving, reading and/or computer work   []Reduced ability to perform lifting, reaching, carrying tasks   [x]Reduced ability to forward bend   []Reduced ability to ambulate prolonged functional periods/distances/surfaces   []Reduced ability to ascend/descend stairs  []Reduced ability to concentrate/focus  [x]Reduced ability to turn/pitch head rapidly  [x]Reduced ability to self-correct for losses of balance []other:       Participation Restrictions   [x]Reduced participation in self-care activities   [x]Reduced participation in home management activities   []Reduced participation in work activities   [x]Reduced participation in social activities   []Reduced participation in sport/recreational activities    Classification:   [x]Signs/symptoms consistent with BPPV (benign paroxysmal positional vertigo)      [x]Signs/symptoms consistent with present problem with:  [] no personal factors and/or comorbidities that impact the plan of care;  [x]1-2 personal factors and/or comorbidities that impact the plan of care  []3 personal factors and/or comorbidities that impact the plan of care  [x] An examination of body systems using standardized tests and measures addressing any of the following: body structures and functions (impairments), activity limitations, and/or participation restrictions;:  [x] a total of 1-2 or more elements   [] a total of 3 or more elements   [] a total of 4 or more elements   [x] A clinical presentation with:  [x] stable and/or uncomplicated characteristics   [] evolving clinical presentation with changing characteristics  [] unstable and unpredictable characteristics;   [x] Clinical decision making of [x] low, [] moderate, [] high complexity using standardized patient assessment instrument and/or measurable assessment of functional outcome. [x] EVAL (LOW) 03270 (typically 15 minutes face-to-face)  [] EVAL (MOD) 78971 (typically 30 minutes face-to-face)  [] EVAL (HIGH) 11337 (typically 45 minutes face-to-face)  [] RE-EVAL     HEP instruction: Written HEP instructions provided and reviewed. GOALS:  Patient stated goal: \"Get rid of my dizziness\"  [] Progressing: [] Met: [] Not Met: [] Adjusted    Therapist goals for Patient:   Short Term Goals: To be achieved in: 2 weeks  1. Independent in HEP and progression per patient tolerance, in order to prevent re-injury. [] Progressing: [] Met: [] Not Met: [] Adjusted  2. Patient will have a decrease in dizziness/imbalance/symptoms by 40% to facilitate improvement in movement, function, balance and ADLs as indicated by Functional Deficits. [] Progressing: [] Met: [] Not Met: [] Adjusted    Long Term Goals: To be achieved in: at discharge  1. Disability index score of 205% or less for the [facility] to assist with reaching prior level of function.    [] Progressing: [] Met: [] Not Met: [] Adjusted  2. Patient will demonstrate negative positional testing as indicated by patients Functional Deficits. [] Progressing: [] Met: [] Not Met: [] Adjusted  3. Patient will return to functional activities including rolling over in bed without increased symptoms or restriction. [] Progressing: [] Met: [] Not Met: [] Adjusted  4. Patient will be able to sleep at night without c/o dizziness  [] Progressing: [] Met: [] Not Met: [] Adjusted     PLAN:   Today's Treatment:    [x] See flowsheet   [x] Patient treated with canalith repositioning maneuver   [x] Education materials provided on BPPV/Vestibular Dysfunction/Habituation   [x] Precautions provided and patient to follow precautions for next 24 hours in regards to BPPV management   [] Written home exercise instructions   [] Other:    Frequency/Duration:  1-2 days per week for 4 Weeks:  Interventions:  [x]  Therapeutic exercise including: strength training, ROM, for LEs, cervical spine & UEs  [x]  NMR activation and proprioception for BLEs, vestibular training/habituation, balance, coordination  [x]  Manual therapy as indicated via: canalith repositioning maneuvers, Dry Needling/IASTM, STM, PROM, Gr I-IV mobilizations, manipulation. [x]  Modalities as needed that may include: thermal agents, E-stim, Biofeedback, US, iontophoresis as indicated  [x]  Gait training  [x]  Patient education on BPPV/vestibular function, balance, postural re-education, activity modification, progression of HEP. Electronically signed by:  Ko Nieves PT  , OMT-C,  [id number]    Note: If patient does not return for scheduled/recommended follow up visits, this note will serve as a discharge from care along with the most recent update on progress.

## 2021-05-14 ENCOUNTER — HOSPITAL ENCOUNTER (OUTPATIENT)
Dept: PHYSICAL THERAPY | Age: 71
Setting detail: THERAPIES SERIES
Discharge: HOME | End: 2021-05-14
Payer: MEDICARE

## 2021-05-14 PROCEDURE — 97530 THERAPEUTIC ACTIVITIES: CPT

## 2021-05-14 PROCEDURE — 97112 NEUROMUSCULAR REEDUCATION: CPT

## 2021-05-14 NOTE — FLOWSHEET NOTE
[place] [facility], [facility]  [address] [facility], [facility]  Phone: [phone]   Fax:     [phone]      Physical Therapy Treatment Note/ Progress Report:   Date:  2021    Patient Name:  Lizzie Minor    :  1950  MRN: [id number]    Pertinent Medical History:  OP, RA, B RTC repair, h/o 6 cervical surgeries    Medical/Treatment Diagnosis Information:  · Diagnosis: Acute severe vertigo  · Treatment Diagnosis: Vestibular Impairment    Insurance/Certification information:  PT Insurance Information: Medicare  Physician Information:  Referring Practitioner: Kaye Nuno MD / Franklin Sosa MD  Plan of care signed (Y/N): []  Yes [x]  No     Date of Patient follow up with Physician:      Progress Report: []  Yes  [x]  No     Date Range for reporting period:  Beginnin2021  Ending:     Progress report due (10 Rx/or 30 days whichever is less):     Recertification due (POC duration/ or 90 days whichever is less):  21    Visit # Insurance Allowable Auth required? 2 BOMN []  Yes [x]  No     Latex Allergy:  [x]NO      []YES  Preferred Language for Healthcare:   [x]English       []other:    Functional Outcomes Measure:   Date Assessed:  Test: DHI  Score: 52    Pain level:  0/10   Dizziness level: 1-2/10    History of Injury:Patient reports that about 2 weeks ago she went to lay down in bed due to stomach not feeling well. When turning her head in bed to get a pillow she had severe episode of vertigo. Patient stated the whole room was spinning. This lasted for about 8 hours where she laid in bed with covers over her head. Finally went to the ER to be evaluated. Patient was admitted for 4 days to the ER. MRI of the brain was negative for CVA. Patient was later discharged to home.   Patient reports that the dizziness is better but if she turns her head to the right or rolls on her right side when laying down she gets severe vertigo. Patient was given antibiotics and steroids while in the hospital.     SUBJECTIVE:  5/14: Patient notes she has been dizzy and off balance since last visit. States she mainly just stayed in bed all day after last visit due to nausea. Also states she had N/T in B arms and legs after first treatment that she thinks was due to her neck issues. Since the first day her symptoms have gradually been improving. Notes she can roll onto her R side with minimal issues now. Only occasionally getting vertigo but it has not made her sick. Main c/o now is just a little off balance and nausea. OBJECTIVE:   Postural Control Tests:  Clinical Test of Sensory Interaction for Balance (CTSIB) performed in Romberg stance  CONDITION TIME STRATEGY SWAY    Eyes open, firm surface x30 sec   none    Eyes closed, firm surface x30 sec ankle Very slight    Eyes open, foam surface x30 sec ankle mild    Eyes closed, foam surface x30 sec  Hip  moderate      Gait: No major deviations noted              Oculomotor/Vestibular Examination:     Spontaneous nystagmus:       []? Left             []? Right           [x]? Absent  Gaze-Evoked nystagmus with fixation present:              Primary            []? Present       [x]? Absent              Right                [x]? Present       []? Absent              Left                  [x]? Present       []? Absent  VOR Head Thrust Test:          [x]? Normal       []? Abnormal    Comments: fast beat to the right  VOR Cancellation:                  []? Normal       []? Abnormal    Comments:   Smooth Pursuit:                      []? Normal       [x]? Abnormal    Comments: fast beat to the right  Saccades:                               []? Normal       [x]? Abnormal    Comments: fast beat to the right  Convergence:                          [x]? Normal       []?  Abnormal    Comments:      Positional Testing - Updated 5/14/21  R Hallpike-[place] maneuver: Nystagmus:     []? Yes             [x]? No               []? Duration:                                          []? Direction:                   Vertigo:            []? Yes             [x]? No               []? Duration:   L Hallpike-Kylie maneuver:              Nystagmus:     [x]? Yes             []? No               [x]? Duration: 10 secs, very mild                                       [x]? Direction:  upbeating to the left                Vertigo:            [x]? Yes             []? No               [x]? Duration: 10 secs, very mild  Supine roll head right:              Nystagmus:     []? Yes             [x]? No               []? Duration:                                          []? Direction:                  Vertigo:            []? Yes             [x]? No               []? Duration:   Supine roll head left:              Nystagmus:     []? Yes             [x]? No               []? Duration:                                          []? Direction:                  Vertigo:            []? Yes             [x]? No               []? Duration:     RESTRICTIONS/PRECAUTIONS: pt reports she is suppose to have surgery in a few weeks on cspine for possible fusion    Exercises/Interventions:     Therapeutic Exercises (94650) Min: Resistance/Reps Notes/Cues                            Therapeutic Activities (69742) Min:10     HEP instruction for balance retraining x10 mins - see details below                        Neuromuscular Re-ed (25063) Min:20     Reassessment of positional testing x15 mins    Canalith Repositioning Procedure 2x for L post canalithiasis 5/14: after 1st CRP decreased nystagmus noted and brief few secs of vertigo.   After 2nd CRP (-) Yelena Chahal with no c/o vertigo             Manual Intervention (91172) Min:                Modalities  Min:                      Other Therapeutic Activities: Pt was educated on PT POC, Diagnosis, Prognosis, pathomechanics as well as frequency and duration of scheduling future physical therapy appointments. Time was also taken on this day to answer all patient questions and participation in PT. Reviewed appointment policy in detail with patient and patient verbalized understanding. 5/14: Called and spoke with daughter via phone per patient request.  Reviewed with daughter today's diagnosis and treatment. Also reviewed with daughter HEP for the weekend. Family member instructed on how to proceed with mother over the weekend if she would have an episode of vertigo. Daughter works as a PT and is familiar with [place]-hallpike and Epley maneuver. Daughter voiced understanding of instructions. Home Exercise Program: Patient was instructed in the following for HEP: self epley maneuver for R posterior canalithiasis. Patient verbalized/demonstrated understanding and was issued written handout. 5/14: Patient instructed to hold on doing epley over the weekend due (-) positional testing at end of session. Patient issued the following for HEP to help with balance retraining: NBOS with EO, NBOS with EC, and tandem stance. Instructed to do at kitchen counter for safety. Patient verbalized/demonstrated understanding and was issued written handout. Therapeutic Exercise and NMR EXR  [] (72123) Provided verbal/tactile cueing for activities related to strengthening, flexibility, endurance, ROM for improvements in LE, proximal hip, and core control with self care, mobility, lifting, ambulation. [x] (62927) Provided verbal/tactile cueing for activities related to improving balance, coordination, kinesthetic sense, posture, motor skill, proprioception  to assist with LE, proximal hip, and core control in self care, mobility, lifting, ambulation and eccentric single leg control.  [address] and Therapeutic Activities:    [x] (24214 or 38511) Provided verbal/tactile cueing for activities related to improving balance, coordination, kinesthetic sense, posture, motor skill, proprioception and motor activation to allow for proper function of core, proximal hip and LE with self care and ADLs and functional mobility.   [] (82387) Gait Re-education- Provided training and instruction to the patient for proper LE, core and proximal hip recruitment and positioning and eccentric body weight control with ambulation re-education including up and down stairs     Home Exercise Program:    [] (15190) Reviewed/Progressed HEP activities related to strengthening, flexibility, endurance, ROM of core, proximal hip and LE for functional self-care, mobility, lifting and ambulation/stair navigation   [x] (37109)Reviewed/Progressed HEP activities related to improving balance, coordination, kinesthetic sense, posture, motor skill, proprioception of core, proximal hip and LE for self care, mobility, lifting, and ambulation/stair navigation      Manual Treatments:  PROM / STM / Oscillations-Mobs:  G-I, II, III, IV (PA's, Inf., Post.)  [] (75259) Provided manual therapy to mobilize LE, proximal hip and/or LS spine soft tissue/joints for the purpose of modulating pain, promoting relaxation,  increasing ROM, reducing/eliminating soft tissue swelling/inflammation/restriction, improving soft tissue extensibility and allowing for proper ROM for normal function with self care, mobility, lifting and ambulation.      Charges:  Timed Code Treatment Minutes: 30   Total Treatment Minutes: 30      [] EVAL (LOW) 05858 (typically 20 minutes face-to-face)  [] EVAL (MOD) 11156 (typically 30 minutes face-to-face)  [] EVAL (HIGH) 73476 (typically 45 minutes face-to-face)  [] RE-EVAL     [] HH(24000) x     [] Dry needle 1 or 2 Muscles (08992)  [x] NMR (94403) x     [] Dry needle 3+ Muscles (34096)  [] Manual (69001) x     [] Ultrasound (46468) x  [x] TA (16664) x     [] Mech Traction (35123)  [] ES(attended) (02197)     [] ES (un) (43326):   [] Vasopump (94338) [] Ionto (05476)   [] Other:    GOALS:  Patient stated goal: \"Get rid of my dizziness\"  []? Progressing: []? Met: []? Not Met: []? Adjusted     Therapist goals for Patient:   Short Term Goals: To be achieved in: 2 weeks  1. Independent in HEP and progression per patient tolerance, in order to prevent re-injury. []? Progressing: []? Met: []? Not Met: []? Adjusted  2. Patient will have a decrease in dizziness/imbalance/symptoms by 40% to facilitate improvement in movement, function, balance and ADLs as indicated by Functional Deficits. []? Progressing: []? Met: []? Not Met: []? Adjusted     Long Term Goals: To be achieved in: at discharge  1. Disability index score of 205% or less for the [facility] to assist with reaching prior level of function. []? Progressing: []? Met: []? Not Met: []? Adjusted  2. Patient will demonstrate negative positional testing as indicated by patients Functional Deficits. []? Progressing: []? Met: []? Not Met: []? Adjusted  3. Patient will return to functional activities including rolling over in bed without increased symptoms or restriction. []? Progressing: []? Met: []? Not Met: []? Adjusted  4. Patient will be able to sleep at night without c/o dizziness  []? Progressing: []? Met: []? Not Met: []? Adjusted          ASSESSMENT:  Patient presented with resolution of R BPPV today but did present with very slight L BPPV posterior canalithiasis. Good response from L canalith repositioning procedure with (-) positional testing noted afterwards. Patient would benefit from balance retraining now that BPPV seems to be resolved. Patient did show up for PT visit an hour early due to had wrong time. PT was only able to treat patient for 30 mins today due to other patients already on the schedule at that time.      Treatment/Activity Tolerance:  [x] Patient tolerated treatment well [] Patient limited by fatique  [] Patient limited by pain  [] Patient limited by other medical complications  [] Other:     Overall Progression Towards Functional goals/ Treatment Progress Update:  [] Patient is progressing as expected towards functional goals listed. [] Progression is slowed due to complexities/Impairments listed. [] Progression has been slowed due to co-morbidities. [x] Plan just implemented, too soon to assess goals progression <30days   [] Goals require adjustment due to lack of progress  [] Patient is not progressing as expected and requires additional follow up with physician  [] Other    Prognosis for POC: [x] Good [] Fair  [] Poor    Patient requires continued skilled intervention: [x] Yes  [] No        PLAN: Reassess positional testing  [x] Continue per plan of care [] Alter current plan (see comments)  [] Plan of care initiated [] Hold pending MD visit [] Discharge    Electronically signed by: Jos Ortiz, PT , OMT-C,  [id number]    Note: If patient does not return for scheduled/recommended follow up visits, this note will serve as a discharge from care along with the most recent update on progress.

## 2021-05-18 ENCOUNTER — HOSPITAL ENCOUNTER (OUTPATIENT)
Dept: PHYSICAL THERAPY | Age: 71
Setting detail: THERAPIES SERIES
Discharge: HOME | End: 2021-05-18
Payer: MEDICARE

## 2021-05-18 PROCEDURE — 97112 NEUROMUSCULAR REEDUCATION: CPT

## 2021-05-18 NOTE — FLOWSHEET NOTE
[place] [facility], [facility]  [address] [facility] [place]  Phone: [phone]   Fax:     [phone]      Physical Therapy Treatment Note/ Progress Report:   Date:  2021    Patient Name:  Carson Gutierrez    :  1950  MRN: [id number]    Pertinent Medical History:  OP, RA, B RTC repair, h/o 6 cervical surgeries    Medical/Treatment Diagnosis Information:  · Diagnosis: Acute severe vertigo  · Treatment Diagnosis: Vestibular Impairment    Insurance/Certification information:  PT Insurance Information: Medicare  Physician Information:  Referring Practitioner: La Nguyen MD / Karine Mccall MD  Plan of care signed (Y/N): []  Yes [x]  No     Date of Patient follow up with Physician:      Progress Report: []  Yes  [x]  No     Date Range for reporting period:  Beginnin2021  Ending:     Progress report due (10 Rx/or 30 days whichever is less):     Recertification due (POC duration/ or 90 days whichever is less):  21    Visit # Insurance Allowable Auth required? 3 BOMN []  Yes [x]  No     Latex Allergy:  [x]NO      []YES  Preferred Language for Healthcare:   [x]English       []other:    Functional Outcomes Measure:   Date Assessed:  Test: DHI  Score: 52    Pain level:  0/10   Dizziness level: 1-2/10    History of Injury:Patient reports that about 2 weeks ago she went to lay down in bed due to stomach not feeling well. When turning her head in bed to get a pillow she had severe episode of vertigo. Patient stated the whole room was spinning. This lasted for about 8 hours where she laid in bed with covers over her head. Finally went to the ER to be evaluated. Patient was admitted for 4 days to the ER. MRI of the brain was negative for CVA. Patient was later discharged to home.   Patient reports that the dizziness is better but if she turns her head to the right or rolls on her for L post canalithiasis - epley maneuver 5/18: (+) L SL test noted after liberatory maneuvers  (-) L SL test noted after epley maneuver   Loel Solum - [place]off exercise x10 mins Instructions given for HEP        Manual Intervention (63160) Min:                Modalities  Min:                      Other Therapeutic Activities: Pt was educated on PT POC, Diagnosis, Prognosis, pathomechanics as well as frequency and duration of scheduling future physical therapy appointments. Time was also taken on this day to answer all patient questions and participation in PT. Reviewed appointment policy in detail with patient and patient verbalized understanding. 5/14: Called and spoke with daughter via phone per patient request.  Reviewed with daughter today's diagnosis and treatment. Also reviewed with daughter HEP for the weekend. Family member instructed on how to proceed with mother over the weekend if she would have an episode of vertigo. Daughter works as a PT and is familiar with [place]-hallpike and Epley maneuver. Daughter voiced understanding of instructions. Home Exercise Program: Patient was instructed in the following for HEP: self epley maneuver for R posterior canalithiasis. Patient verbalized/demonstrated understanding and was issued written handout. 5/14: Patient instructed to hold on doing epley over the weekend due (-) positional testing at end of session. Patient issued the following for HEP to help with balance retraining: NBOS with EO, NBOS with EC, and tandem stance. Instructed to do at kitchen counter for safety. Patient verbalized/demonstrated understanding and was issued written handout. 5/18: Patient instructed to add [address] to current balance retraining exercises. Patient verbalized/demonstrated understanding and was issued written handout.     Therapeutic Exercise and NMR EXR  [] (07427) Provided verbal/tactile cueing for activities related to strengthening, flexibility, endurance, ROM for improvements in LE, proximal hip, and core control with self care, mobility, lifting, ambulation. [x] (51626) Provided verbal/tactile cueing for activities related to improving balance, coordination, kinesthetic sense, posture, motor skill, proprioception  to assist with LE, proximal hip, and core control in self care, mobility, lifting, ambulation and eccentric single leg control. [address] and Therapeutic Activities:    [x] (92790 or 90462) Provided verbal/tactile cueing for activities related to improving balance, coordination, kinesthetic sense, posture, motor skill, proprioception and motor activation to allow for proper function of core, proximal hip and LE with self care and ADLs and functional mobility.   [] (04632) Gait Re-education- Provided training and instruction to the patient for proper LE, core and proximal hip recruitment and positioning and eccentric body weight control with ambulation re-education including up and down stairs     Home Exercise Program:    [] (60172) Reviewed/Progressed HEP activities related to strengthening, flexibility, endurance, ROM of core, proximal hip and LE for functional self-care, mobility, lifting and ambulation/stair navigation   [x] (40502)Reviewed/Progressed HEP activities related to improving balance, coordination, kinesthetic sense, posture, motor skill, proprioception of core, proximal hip and LE for self care, mobility, lifting, and ambulation/stair navigation      Manual Treatments:  PROM / STM / Oscillations-Mobs:  G-I, II, III, IV (PA's, Inf., Post.)  [] (76671) Provided manual therapy to mobilize LE, proximal hip and/or LS spine soft tissue/joints for the purpose of modulating pain, promoting relaxation,  increasing ROM, reducing/eliminating soft tissue swelling/inflammation/restriction, improving soft tissue extensibility and allowing for proper ROM for normal function with self care, mobility, lifting and ambulation.      Charges:  Timed Code Treatment Minutes: 40   Total Treatment Minutes: 40      [] EVAL (LOW) 13403 (typically 20 minutes face-to-face)  [] EVAL (MOD) 19392 (typically 30 minutes face-to-face)  [] EVAL (HIGH) 22606 (typically 45 minutes face-to-face)  [] RE-EVAL     [] BI(21409) x     [] Dry needle 1 or 2 Muscles (23233)  [x] NMR (19657) x 3    [] Dry needle 3+ Muscles (43378)  [] Manual (75707) x     [] Ultrasound (92539) x  [] TA (07664) x     [] Mech Traction (75871)  [] ES(attended) (31911)     [] ES (un) (50494):   [] Vasopump (62741) [] Ionto (51765)   [] Other:    GOALS:  Patient stated goal: \"Get rid of my dizziness\"  []? Progressing: []? Met: []? Not Met: []? Adjusted     Therapist goals for Patient:   Short Term Goals: To be achieved in: 2 weeks  1. Independent in HEP and progression per patient tolerance, in order to prevent re-injury. []? Progressing: []? Met: []? Not Met: []? Adjusted  2. Patient will have a decrease in dizziness/imbalance/symptoms by 40% to facilitate improvement in movement, function, balance and ADLs as indicated by Functional Deficits. []? Progressing: []? Met: []? Not Met: []? Adjusted     Long Term Goals: To be achieved in: at discharge  1. Disability index score of 205% or less for the [facility] to assist with reaching prior level of function. []? Progressing: []? Met: []? Not Met: []? Adjusted  2. Patient will demonstrate negative positional testing as indicated by patients Functional Deficits. []? Progressing: []? Met: []? Not Met: []? Adjusted  3. Patient will return to functional activities including rolling over in bed without increased symptoms or restriction. []? Progressing: []? Met: []? Not Met: []? Adjusted  4. Patient will be able to sleep at night without c/o dizziness  []? Progressing: []? Met: []? Not Met: []? Adjusted          ASSESSMENT:  Patient presented with resolution of R BPPV today but did present with very slight L BPPV posterior canalithiasis.   Good response from L canalith repositioning procedure epley maneuver with (-) positional testing noted afterwards. Patient issued Christopher Emerald exercises to help with any residual vertigo. Patient has responded well to balance retraining exercises and is now able to ambulate without use of AD safely. Treatment/Activity Tolerance:  [x] Patient tolerated treatment well [] Patient limited by fatique  [] Patient limited by pain  [] Patient limited by other medical complications  [] Other:     Overall Progression Towards Functional goals/ Treatment Progress Update:  [] Patient is progressing as expected towards functional goals listed. [] Progression is slowed due to complexities/Impairments listed. [] Progression has been slowed due to co-morbidities. [x] Plan just implemented, too soon to assess goals progression <30days   [] Goals require adjustment due to lack of progress  [] Patient is not progressing as expected and requires additional follow up with physician  [] Other    Prognosis for POC: [x] Good [] Fair  [] Poor    Patient requires continued skilled intervention: [x] Yes  [] No        PLAN: Reassess positional testing  [x] Continue per plan of care [] Alter current plan (see comments)  [] Plan of care initiated [] Hold pending MD visit [] Discharge    Electronically signed by: Jennifer Deng PT , OMT-C,  [id number]    Note: If patient does not return for scheduled/recommended follow up visits, this note will serve as a discharge from care along with the most recent update on progress.

## 2021-05-21 ENCOUNTER — APPOINTMENT (OUTPATIENT)
Dept: PHYSICAL THERAPY | Age: 71
End: 2021-05-21
Payer: MEDICARE

## 2021-05-26 NOTE — PROGRESS NOTES
Kayy Alexis   1950, 66 y.o. female   <[id number]>       Referring Provider: Eli Whatley MD  Referral Type: In an order in [address] [place] [address]    Reason for Visit: Evaluation of suspected change in hearing, tinnitus, or balance. ADULT AUDIOLOGIC EVALUATION      Kayy Alexis is a 66 y.o. female seen today, 5/27/2021 , for an initial audiologic evaluation. Patient was seen by Eli Whatley MD following today's evaluation. AUDIOLOGIC AND OTHER PERTINENT MEDICAL HISTORY:      Kayy Alexis noted tinnitus and dizziness. Patient reports episodes of dizziness which she describes as a spinning lasting about 10 hours. This began about a month ago. Patient reports she has been working with physical therapy and has noticed an improvement in dizziness symptoms. She also notes a gradual decrease in hearing that is greater in the right ear. She has intermittent right sided tinnitus. Of note, patient reports her mother and sisters have history of hearing loss. Kayy Alexis denied otalgia, aural fullness, otorrhea, imbalance, history of falls, history of significant noise exposure, history of head trauma, history of ear surgery and family history of hearing loss. Date: 5/27/2021     IMPRESSIONS:      AD:Hearing WNL sloping to Moderately-Severe SNHL, Excellent WRS, Type A tymp  AS:Hearing WNL sloping to Moderate SNHL, Excellent WRS, Type Ad tymp    Hearing loss consistent with SNHL. Hearing loss significant enough to create hearing difficulty in most listening situations. Discussed hearing loss, tinnitus, dizziness, and hearing aids with patient.  Patient to follow medical recommendations per Eli Whatley MD .    ASSESSMENT AND FINDINGS:     Otoscopy revealed: Clear ear canals bilaterally    RIGHT EAR:  Hearing Sensitivity: Normal hearing sensitivity to Moderately-Severe Sensorineural hearing loss  Speech Recognition Threshold: 20 dB HL  Word Recognition: Excellent (%), based on NU-6  25-word list at 65 dBHL using recorded speech stimuli. Tympanometry: Normal peak pressure and compliance, Type A tympanogram, consistent with normal middle ear function. Acoustic Reflexes: Ipsilateral: Did not test. Contralateral: Did not test.    LEFT EAR:  Hearing Sensitivity: Normal hearing sensitivity to Moderate Sensorineural hearing loss  Speech Recognition Threshold: 15 dB HL  Word Recognition: Excellent (%), based on NU-6 25-word list at 65 dBHL using recorded speech stimuli. Tympanometry: Normal peak pressure with high compliance, Type Ad tympanogram, consistent with hypermobile tympanic membrane. Acoustic Reflexes: Ipsilateral: Did not test. Contralateral: Did not test.    Reliability: Good  Transducer: Inserts    See scanned audiogram dated 5/27/2021  for results. PATIENT EDUCATION:       The following items were discussed with the patient:    - Good Communication Strategies  - Hearing Loss and Hearing Aids  - Tinnitus Management Strategies  - Fall Risk and Prevention   - Dizziness    Educational information was shared in the After Visit Summary. RECOMMENDATIONS:                                                                                                                                                                                                                                                            The following items are recommended based on patient report and results from today's appointment:   - Continue medical follow-up with Claudean Hirschfeld, MD.   - Retest hearing as medically indicated and/or sooner if a change in hearing is noted. - If desired, schedule a Hearing Aid Evaluation (HAE) appointment to discuss hearing aid options  - Utilize \"Good Communication Strategies\" as discussed to assist in speech understanding with communication partners  - Maintain a sound enriched environment to assist in the management of tinnitus symptoms.      - If medically indicated, consider vestibular evaluation to further investigate symptoms of dizziness.        Jorge Cross  Audiologist    Chart CC'd to: Christiano Coleman MD      Degree of   Hearing Sensitivity dB Range   Within Normal Limits (WNL) 0 - 20   Mild 20 - 40   Moderate 40 - 55   Moderately-Severe 55 - 70   Severe 70 - 90   Profound 90 +

## 2021-05-27 ENCOUNTER — OFFICE VISIT (OUTPATIENT)
Dept: ENT CLINIC | Age: 71
End: 2021-05-27
Payer: MEDICARE

## 2021-05-27 ENCOUNTER — PROCEDURE VISIT (OUTPATIENT)
Dept: AUDIOLOGY | Age: 71
End: 2021-05-27
Payer: MEDICARE

## 2021-05-27 VITALS
SYSTOLIC BLOOD PRESSURE: 144 MMHG | HEART RATE: 76 BPM | BODY MASS INDEX: 29.06 KG/M2 | HEIGHT: 63 IN | WEIGHT: 164 LBS | DIASTOLIC BLOOD PRESSURE: 84 MMHG

## 2021-05-27 DIAGNOSIS — H90.3 SENSORINEURAL HEARING LOSS (SNHL) OF BOTH EARS: Primary | ICD-10-CM

## 2021-05-27 DIAGNOSIS — H93.13 TINNITUS OF BOTH EARS: ICD-10-CM

## 2021-05-27 DIAGNOSIS — H93.11 TINNITUS OF RIGHT EAR: ICD-10-CM

## 2021-05-27 DIAGNOSIS — H81.20 VESTIBULAR NEURONITIS, UNSPECIFIED LATERALITY: ICD-10-CM

## 2021-05-27 PROCEDURE — 1036F TOBACCO NON-USER: CPT | Performed by: OTOLARYNGOLOGY

## 2021-05-27 PROCEDURE — 92557 COMPREHENSIVE HEARING TEST: CPT | Performed by: AUDIOLOGIST

## 2021-05-27 PROCEDURE — G8417 CALC BMI ABV UP PARAM F/U: HCPCS | Performed by: OTOLARYNGOLOGY

## 2021-05-27 PROCEDURE — 99203 OFFICE O/P NEW LOW 30 MIN: CPT | Performed by: OTOLARYNGOLOGY

## 2021-05-27 PROCEDURE — 1090F PRES/ABSN URINE INCON ASSESS: CPT | Performed by: OTOLARYNGOLOGY

## 2021-05-27 PROCEDURE — 3017F COLORECTAL CA SCREEN DOC REV: CPT | Performed by: OTOLARYNGOLOGY

## 2021-05-27 PROCEDURE — 92567 TYMPANOMETRY: CPT | Performed by: AUDIOLOGIST

## 2021-05-27 PROCEDURE — 4040F PNEUMOC VAC/ADMIN/RCVD: CPT | Performed by: OTOLARYNGOLOGY

## 2021-05-27 PROCEDURE — 1123F ACP DISCUSS/DSCN MKR DOCD: CPT | Performed by: OTOLARYNGOLOGY

## 2021-05-27 PROCEDURE — G8427 DOCREV CUR MEDS BY ELIG CLIN: HCPCS | Performed by: OTOLARYNGOLOGY

## 2021-05-27 PROCEDURE — G8400 PT W/DXA NO RESULTS DOC: HCPCS | Performed by: OTOLARYNGOLOGY

## 2021-05-27 NOTE — Clinical Note
Dr. Mark Reddy,    Please see note from this patient's audiogram from today. Please let me know if there is anything further you need.         Jorge Webster  Audiologist

## 2021-05-27 NOTE — PATIENT INSTRUCTIONS
directly into a persons ear      Some additional items that may be helpful:   - Remain patient - this is important for both parties   - Write down items that still cannot be heard/understood. You may write with pen/paper or consider typing/texting on a cell phone or smart device. - If background noise is unavoidable, try to keep yourself in a good position in the room. By sitting at a gallegos on the side of the restaurant (preferably a corner), it will be easier to communicate than if you were sitting at a table in the middle with background noise surrounding you. Keep yourself positioned away from music speakers or heavy foot traffic. Hearing Loss: Care Instructions  Your Care Instructions      Hearing loss is a sudden or slow decrease in how well you hear. It can range from mild to profound. Permanent hearing loss can occur with aging, and it can happen when you are exposed long-term to loud noise. Examples include listening to loud music, riding motorcycles, or being around other loud machines. Hearing loss can affect your work and home life. It can make you feel lonely or depressed. You may feel that you have lost your independence. But hearing aids and other devices can help you hear better and feel connected to others. Follow-up care is a key part of your treatment and safety. Be sure to make and go to all appointments, and call your doctor if you are having problems. It's also a good idea to know your test results and keep a list of the medicines you take. How can you care for yourself at home? · Avoid loud noises whenever possible. This helps keep your hearing from getting worse. Always wear hearing protection around loud noises. · If appropriate, wear hearing aid(s) as directed. It is recommended that hearing aids are worn during all waking hours to keep your brain active and give it access to the sounds it is missing.       · If you are beginning your process with hearing aid(s), schedule a \"Hearing Aid Evaluation\" with an audiologist to discuss your lifestyle, features of hearing aid technology, and styles of hearing aids available. It is recommended that you contact your insurance company to determine if you have a hearing aid benefit, as this may dictate who you can see for these services. · Have hearing tests as your doctor suggests. They can show whether your hearing has changed. Your hearing aid may need to be adjusted. · Use other assistive devices as needed. These may include:  ? Telephone amplifiers and hearing aids that can connect to a television, stereo, radio, or microphone. ? Devices that use lights or vibrations. These alert you to the doorbell, a ringing telephone, or a baby monitor. ? Television closed-captioning. This shows the words at the bottom of the screen. Most new TVs can do this. ? TTY (text telephone). This lets you type messages back and forth on the telephone instead of talking or listening. These devices are also called TDD. When messages are typed on the keyboard, they are sent over the phone line to a receiving TTY. The message is shown on a monitor. · Use pagers, fax machines, text, and email if it is hard for you to communicate by telephone. · Try to learn a listening technique called speech-reading. It is not lip-reading. You pay attention to people's gestures, expressions, posture, and tone of voice. These clues can help you understand what a person is saying. Face the person you are talking to, and have him or her face you. Make sure the lighting is good. You need to see the other person's face clearly. · Think about counseling if you need help to adjust to your hearing loss. When should you call for help? Watch closely for changes in your health, and be sure to contact your doctor if:    · You think your hearing is getting worse. · You have new symptoms, such as dizziness or nausea.            Tinnitus: Overview and Management Strategies Many people have some ringing sounds in their ears once in a while. You may hear a roar, a hiss, a tinkle, or a buzz. The sound usually lasts only a few minutes. If it goes on all the time, you may have tinnitus. Tinnitus is usually caused by long-term exposure to loud noise. This damages the nerves in the inner ear. It can occur with all types of hearing loss. It may be a symptom of almost any ear problem. Tinnitus may be caused by a buildup of earwax. Or, it may be caused by ear infections or certain medicines (especially antibiotics or large amounts of aspirin). You can also hear noises in your ears because of an injury to the ears, drinking too much alcohol or caffeine, or a medical condition. Other conditions may also contribute to tinnitus, including: head and neck trauma, temporomandibular joint disorder (TMJ), sinus pressure and barometric trauma, traumatic brain injury, metabolic disorders, autoimmune disorders, stress, and high blood pressure. You may need tests to evaluate your hearing and to find causes of long-lasting tinnitus. Your doctor may suggest one or more treatments to help you cope with the tinnitus. You can also do things at home to help reduce symptoms. Causes of Tinnitus  We do not know the exact cause of tinnitus. One thing we do know is that you are not imagining it. If you have tinnitus, chances are the cause will remain a mystery. Conditions that might cause tinnitus include the following:    Hearing loss    Ménière's disease    Loud noise exposure    Migraines    Head injury    Drugs or medicines that are toxic to hearing    Anemia    High blood pressure    Stress    A lot of wax in the ear    Certain types of tumors    Having a lot of caffeine    Smoking cigarettes  You may find that your tinnitus is worse at night. This happens because it is quiet and you are not distracted. Feeling tired and stressed may make your tinnitus worse.     Follow-up care is a key part of your treatment and safety. Be sure to make and go to all appointments, and call your doctor if you are having problems. It's also a good idea to know your test results and keep a list of the medicines you take. How can you care for yourself at home? · Limit or cut out alcohol, caffeine, and sodium. They can make your symptoms worse. · Do not smoke or use other tobacco products. Nicotine reduces blood flow to the ear and makes tinnitus worse. If you need help quitting, talk to your doctor about stop-smoking programs and medicines. These can increase your chances of quitting for good. · Talk to your doctor about whether to stop taking aspirin and similar products such as ibuprofen or naproxen. · Get exercise often. It can help improve blood flow to the ear. Hearing Aids and Other Devices  A hearing aid may help your tinnitus if you have a hearing loss. An audiologist can help you find and use the best hearing aid for you. Tinnitus maskers look like hearing aids. They make a sound that masks, or covers up, the tinnitus. The masking sound distracts you from the ringing in your ears. You may be able to use a masker and a hearing aid at the same time. Sound machines can be useful at night or during quiet times. There are machines you can buy at the store. Or, you can find apps on your phone that make sounds, like the ocean or rainfall. Fish tanks, fans, quiet music, and indoor waterfalls can help, as well. Ways to manage/cope with tinnitus  Some tinnitus may last a long time. To manage your tinnitus, try to:  · Avoid noises that you think caused your tinnitus. If you can't avoid loud noises, wear earplugs or earmuffs. · Ignore the sound by paying attention to other things. Keeping your brain busy with other tasks or background noise can help your brain not focus on the tinnitus. · Try to not give the tinnitus an emotional reaction. Do your best to ignore the sound and not let it bother you. within 1 week after an ear injury. · Your tinnitus bothers you enough that you want to take medicines to help you cope with it. If you notice changes in your tinnitus and/or your hearing, it is recommended that you have your hearing tested by your audiologist and to follow-up with your physician that manages your hearing loss (such as your ENT or Primary Care doctor). Learning About Hearing Aids  What is a hearing aid? A hearing aid makes sounds louder. It can help some people with hearing problems to hear better. Hearing aids do not restore normal hearing. But they can make it easier to communicate by making sounds clearer. · Digital programmable hearing aids can adjust themselves to work best where you are at any time. You also have more choices in setting them up than with analog hearing aids. There are also different styles of hearing aids. · A behind-the-ear (BTE) hearing aid connects to a plastic ear mold that fits inside the outer ear. BTE hearing aids are used for all levels of hearing loss, especially very severe hearing loss. They may be better for children for safety and growth reasons. Poorly fitting BTE ear molds or a buildup of earwax may cause a whistling sound (feedback). · An in-the-ear (ITE) hearing aid fits in the outer part of the ear. It can be used by people with mild to severe hearing loss. ITE hearing aids can be used with other hearing devices, such as a telecoil that improves hearing during phone calls. ITE hearing aids can be damaged by earwax and fluid draining from the ear. Their small size may be hard for some people to handle. They are not often used in children because the case must be replaced as the child grows. · An in-the-canal (ITC) hearing aid fits into the ear canal. ITC hearing aids are used by people with mild to moderate hearing loss.  They are made to fit the shape and the size of your ear canal. They can be damaged by earwax and fluid draining from

## 2021-05-28 ENCOUNTER — HOSPITAL ENCOUNTER (OUTPATIENT)
Dept: PHYSICAL THERAPY | Age: 71
Setting detail: THERAPIES SERIES
Discharge: HOME | End: 2021-05-28
Payer: MEDICARE

## 2021-05-28 PROCEDURE — 97112 NEUROMUSCULAR REEDUCATION: CPT

## 2021-05-28 NOTE — FLOWSHEET NOTE
[place] [facility], [facility]  [address] [facility]  Phone: [phone]   Fax:     [phone]       Physical Therapy Discharge Summary    Dear Dr. Mariuzs Naranjo,    We had the pleasure of treating the following patient for physical therapy services at [address]. A summary of our findings can be found in the discharge summary below. If you have any questions or concerns regarding these findings, please do not hesitate to contact me at the office phone number above. Thank you for the referral.     Physician Signature:________________________________Date:__________________  By signing above (or electronic signature), therapists plan is approved by physician      Overall Response to Treatment:   [x]Patient is responding well to treatment and improvement is noted with regards  to goals   []Patient should continue to improve in reasonable time if they continue HEP   []Patient has plateaued and is no longer responding to skilled PT intervention    []Patient is getting worse and would benefit from return to referring MD   []Patient unable to adhere to initial POC   []Other:     ASSESSMENT:  Patient presented with resolution of B BPPV. Patient has had a good response from  canalith repositioning procedure epley maneuver with (-) positional testing noted afterwards. Patient has also responded well to balance retraining exercises and is now able to ambulate without use of AD safely.      Date range of Visits: 21 to 21  Total Visits: 4    Plan: D/C with HEP    Physical Therapy Treatment Note/ Progress Report:   Date:  2021    Patient Name:  Marvella Lombard    :  1950  MRN: [id number]    Pertinent Medical History:  OP, RA, B RTC repair, h/o 6 cervical surgeries    Medical/Treatment Diagnosis Information:  · Diagnosis: Acute severe vertigo  · Treatment Diagnosis: Vestibular Impairment    Insurance/Certification information:  PT Insurance Information: Medicare  Physician Information:  Referring Practitioner: Robert Quinonez MD / Barrington Sexton MD  Plan of care signed (Y/N): []  Yes [x]  No     Date of Patient follow up with Physician:      Progress Report: [x]  Yes  []  No     Date Range for reporting period:  Beginnin2021  Endin21    Progress report due (10 Rx/or 30 days whichever is less):     Recertification due (POC duration/ or 90 days whichever is less):  21    Visit # Insurance Allowable Auth required? 4 BOMN []  Yes [x]  No     Latex Allergy:  [x]NO      []YES  Preferred Language for Healthcare:   [x]English       []other:    Functional Outcomes Measure:   Date Assessed: 21  Test: [facility]  Score: 0    Pain level:  0/10   Dizziness level: 0/10    History of Injury:Patient reports that about 2 weeks ago she went to lay down in bed due to stomach not feeling well. When turning her head in bed to get a pillow she had severe episode of vertigo. Patient stated the whole room was spinning. This lasted for about 8 hours where she laid in bed with covers over her head. Finally went to the ER to be evaluated. Patient was admitted for 4 days to the ER. MRI of the brain was negative for CVA. Patient was later discharged to home. Patient reports that the dizziness is better but if she turns her head to the right or rolls on her right side when laying down she gets severe vertigo. Patient was given antibiotics and steroids while in the hospital.     SUBJECTIVE:  : Patient notes she has been dizzy and off balance since last visit. States she mainly just stayed in bed all day after last visit due to nausea. Also states she had N/T in B arms and legs after first treatment that she thinks was due to her neck issues. Since the first day her symptoms have gradually been improving. Notes she can roll onto her R side with minimal issues now.   Only occasionally getting vertigo but it has not made her sick. Main c/o now is just a little off balance and nausea. 5/18: Notes she gets dizziness when she stands up from the chair and rolls over in bed. Vertigo is not as intense and only lasts a very short time. Patient states she feels about 80% better overall. Has not been off balance since starting balance exercises. 5/28: saw Dr. Patricia Marino last week. Patient notes she feels 98% improved. Only has occasional \"lightheadedness\" when getting up out of bed too quick or bending over too far. Is able to sleep, roll over in bed without issues. Has resumed all normal activities. OBJECTIVE:   Postural Control Tests: Updated 5/28/21  Clinical Test of Sensory Interaction for Balance (CTSIB) performed in Romberg stance  CONDITION TIME STRATEGY SWAY    Eyes open, firm surface x30 sec   none    Eyes closed, firm surface x30 sec ankle Very slight    Eyes open, foam surface x30 sec ankle mild    Eyes closed, foam surface x30 sec  ankle  mild      Gait: No major deviations noted              Oculomotor/Vestibular Examination: Updated 5/28/21     Spontaneous nystagmus:       []? Left             []? Right           [x]? Absent  Gaze-Evoked nystagmus with fixation present:              Primary            []? Present       [x]? Absent              Right                []? Present       [x]? Absent              Left                  []? Present       [x]? Absent  VOR Head Thrust Test:          [x]? Normal       []? Abnormal    Comments:   VOR Cancellation:                  [x]? Normal       []? Abnormal    Comments:   Smooth Pursuit:                      [x]? Normal       []? Abnormal    Comments:   Saccades:                               [x]? Normal       []? Abnormal    Comments:   Convergence:                          [x]? Normal       []? Abnormal    Comments:      Positional Testing - Updated 5/28/21  R Hallpike-Kylie maneuver:               Nystagmus:     []?  Yes all patient questions and participation in PT. Reviewed appointment policy in detail with patient and patient verbalized understanding. 5/14: Called and spoke with daughter via phone per patient request.  Reviewed with daughter today's diagnosis and treatment. Also reviewed with daughter HEP for the weekend. Family member instructed on how to proceed with mother over the weekend if she would have an episode of vertigo. Daughter works as a PT and is familiar with Kylie-hallpike and Epley maneuver. Daughter voiced understanding of instructions. Home Exercise Program: Patient was instructed in the following for HEP: self epley maneuver for R posterior canalithiasis. Patient verbalized/demonstrated understanding and was issued written handout. 5/14: Patient instructed to hold on doing epley over the weekend due (-) positional testing at end of session. Patient issued the following for HEP to help with balance retraining: NBOS with EO, NBOS with EC, and tandem stance. Instructed to do at kitchen counter for safety. Patient verbalized/demonstrated understanding and was issued written handout. 5/18: Patient instructed to add [address] to current balance retraining exercises. Patient verbalized/demonstrated understanding and was issued written handout. 5/28: Patient instructed to continue with balance retraining exercises and to hold on epley maneuver and Dinh-Daroff. Patient verbalized/demonstrated understanding and was issued written handout. Therapeutic Exercise and NMR EXR  [] (56221) Provided verbal/tactile cueing for activities related to strengthening, flexibility, endurance, ROM for improvements in LE, proximal hip, and core control with self care, mobility, lifting, ambulation.   [x] (89598) Provided verbal/tactile cueing for activities related to improving balance, coordination, kinesthetic sense, posture, motor skill, proprioception  to assist with LE, proximal hip, and core control in self care, mobility, lifting, ambulation and eccentric single leg control. [address] and Therapeutic Activities:    [x] (80812 or 54608) Provided verbal/tactile cueing for activities related to improving balance, coordination, kinesthetic sense, posture, motor skill, proprioception and motor activation to allow for proper function of core, proximal hip and LE with self care and ADLs and functional mobility.   [] (40221) Gait Re-education- Provided training and instruction to the patient for proper LE, core and proximal hip recruitment and positioning and eccentric body weight control with ambulation re-education including up and down stairs     Home Exercise Program:    [] (57077) Reviewed/Progressed HEP activities related to strengthening, flexibility, endurance, ROM of core, proximal hip and LE for functional self-care, mobility, lifting and ambulation/stair navigation   [x] (65367)Reviewed/Progressed HEP activities related to improving balance, coordination, kinesthetic sense, posture, motor skill, proprioception of core, proximal hip and LE for self care, mobility, lifting, and ambulation/stair navigation      Manual Treatments:  PROM / STM / Oscillations-Mobs:  G-I, II, III, IV (PA's, Inf., Post.)  [] (37607) Provided manual therapy to mobilize LE, proximal hip and/or LS spine soft tissue/joints for the purpose of modulating pain, promoting relaxation,  increasing ROM, reducing/eliminating soft tissue swelling/inflammation/restriction, improving soft tissue extensibility and allowing for proper ROM for normal function with self care, mobility, lifting and ambulation.      Charges:  Timed Code Treatment Minutes: 20   Total Treatment Minutes: 20      [] EVAL (LOW) 17725 (typically 20 minutes face-to-face)  [] EVAL (MOD) 58347 (typically 30 minutes face-to-face)  [] EVAL (HIGH) 03221 (typically 45 minutes face-to-face)  [] RE-EVAL     [] TG(63347) x     [] Dry needle 1 or 2 Muscles (81813)  [x] NMR (64560) x 1 [] Dry needle 3+ Muscles (32541)  [] Manual (69818) x     [] Ultrasound (39952) x  [] TA (81559) x     [] Mech Traction (78044)  [] ES(attended) (50675)     [] ES (un) (20284):   [] Vasopump (00861) [] Ionto (92936)   [] Other:    GOALS:  Patient stated goal: \"Get rid of my dizziness\"  []? Progressing: [x]? Met: []? Not Met: []? Adjusted     Therapist goals for Patient:   Short Term Goals: To be achieved in: 2 weeks  1. Independent in HEP and progression per patient tolerance, in order to prevent re-injury. []? Progressing: [x]? Met: []? Not Met: []? Adjusted  2. Patient will have a decrease in dizziness/imbalance/symptoms by 40% to facilitate improvement in movement, function, balance and ADLs as indicated by Functional Deficits. []? Progressing: [x]? Met: []? Not Met: []? Adjusted     Long Term Goals: To be achieved in: at discharge  1. Disability index score of 25% or less for the [facility] to assist with reaching prior level of function. []? Progressing: [x]? Met: []? Not Met: []? Adjusted  2. Patient will demonstrate negative positional testing as indicated by patients Functional Deficits. []? Progressing: [x]? Met: []? Not Met: []? Adjusted  3. Patient will return to functional activities including rolling over in bed without increased symptoms or restriction. []? Progressing: [x]? Met: []? Not Met: []? Adjusted  4. Patient will be able to sleep at night without c/o dizziness  []? Progressing: [x]? Met: []? Not Met: []? Adjusted           ASSESSMENT:  Patient presented with resolution of B BPPV. Patient has had a good response from  canalith repositioning procedure epley maneuver with (-) positional testing noted afterwards. Patient has also responded well to balance retraining exercises and is now able to ambulate without use of AD safely.      Treatment/Activity Tolerance:  [x] Patient tolerated treatment well [] Patient limited by fatique  [] Patient limited by pain  [] Patient limited by other medical complications  [] Other:     Overall Progression Towards Functional goals/ Treatment Progress Update:  [x] Patient is progressing as expected towards functional goals listed. [] Progression is slowed due to complexities/Impairments listed. [] Progression has been slowed due to co-morbidities. [] Plan just implemented, too soon to assess goals progression <30days   [] Goals require adjustment due to lack of progress  [] Patient is not progressing as expected and requires additional follow up with physician  [] Other    Prognosis for POC: [x] Good [] Fair  [] Poor    Patient requires continued skilled intervention: [x] Yes  [] No        PLAN: D/C with HEP  [x] Continue per plan of care [] Alter current plan (see comments)  [] Plan of care initiated [] Hold pending MD visit [] Discharge    Electronically signed by: Donny Leblanc PT , OMT-C,  [id number]    Note: If patient does not return for scheduled/recommended follow up visits, this note will serve as a discharge from care along with the most recent update on progress.

## 2021-09-07 ENCOUNTER — ANESTHESIA EVENT (OUTPATIENT)
Dept: ENDOSCOPY | Age: 71
End: 2021-09-07
Payer: MEDICARE

## 2021-09-07 NOTE — PROGRESS NOTES
C-Difficile admission screening and protocol:     * Admitted with diarrhea?no     *Prior history of C-Diff. In last 3 months?no     *Antibiotic use in the past 6-8 weeks?no     *Prior hospitalization or nursing home in the last month?    no  Preoperative Screening for Elective Surgery/Invasive Procedures While COVID-19 present in the community     Have you tested positive or have been told to self-isolate for COVID-19 like symptoms within the past 28 days? no   Do you currently have any of the following symptoms?no  o Fever >100.0 F or 99.9 F in immunocompromised patients? o New onset cough, shortness of breath or difficulty breathing?  o New onset sore throat, myalgia (muscle aches and pains), headache, loss of taste/smell or diarrhea?  Have you had a potential exposure to COVID-19 within the past 14 days by:  o Close contact with a confirmed case?no  o Close contact with a healthcare worker,  or essential infrastructure worker (grocery store, [employer], gas station, public utilities or transportation)?no  o Do you reside in a congregate setting such as; skilled nursing facility, adult home, correctional facility, homeless shelter or other institutional setting?no  o Have you had recent travel to a known COVID-19 hotspot? no    Indicate if the patient has a positive screen by answering yes to one or more of the above questions. Patients who test positive or screen positive prior to surgery or on the day of surgery should be evaluated in conjunction with the surgeon/proceduralist/anesthesiologist to determine the urgency of the procedure.

## 2021-09-07 NOTE — PROGRESS NOTES
[facility] time______0730______        Surgery time_____0900_______    Take the following medications with a sip of water:    Do not eat or drink anything after 12:00 midnight prior to your surgery. This includes water chewing gum, mints and ice chips. You may brush your teeth and gargle the morning of your surgery, but do not swallow the water     Please see your family doctor/pediatrician for a history and physical and/or concerning medications. Bring any test results/reports from your physicians office. If you are under the care of a heart doctor or specialist doctor, please be aware that you may be asked to them for clearance    You may be asked to stop blood thinners such as Coumadin, Plavix, Fragmin, Lovenox, etc., or any anti-inflammatories such as:  Aspirin, Ibuprofen, Advil, Naproxen prior to your surgery. We also ask that you stop any OTC medications such as fish oil, vitamin E, glucosamine, garlic, Multivitamins, COQ 10, etc.    We ask that you do not smoke 24 hours prior to surgery  We ask that you do not  drink any alcoholic beverages 24 hours prior to surgery     You must make arrangements for a responsible adult to take you home after your surgery. For your safety you will not be allowed to leave alone or drive yourself home. Your surgery will be cancelled if you do not have a ride home. Also for your safety, it is strongly suggested that someone stay with you the first 24 hours after your surgery. A parent or legal guardian must accompany a child scheduled for surgery and plan to stay at the hospital until the child is discharged. Please do not bring other children with you. For your comfort, please wear simple loose fitting clothing to the hospital.  Please do not bring valuables.     Do not wear any make-up or nail polish on your fingers or toes      For your safety, please do not wear any jewelry or body piercing's on the

## 2021-09-08 ENCOUNTER — HOSPITAL ENCOUNTER (OUTPATIENT)
Age: 71
Setting detail: OUTPATIENT SURGERY
Discharge: HOME | End: 2021-09-08
Attending: INTERNAL MEDICINE | Admitting: INTERNAL MEDICINE
Payer: MEDICARE

## 2021-09-08 ENCOUNTER — ANESTHESIA (OUTPATIENT)
Dept: ENDOSCOPY | Age: 71
End: 2021-09-08
Payer: MEDICARE

## 2021-09-08 VITALS
SYSTOLIC BLOOD PRESSURE: 158 MMHG | BODY MASS INDEX: 27.83 KG/M2 | DIASTOLIC BLOOD PRESSURE: 77 MMHG | HEART RATE: 72 BPM | RESPIRATION RATE: 16 BRPM | TEMPERATURE: 98 F | OXYGEN SATURATION: 95 % | HEIGHT: 63 IN | WEIGHT: 157.09 LBS

## 2021-09-08 VITALS
OXYGEN SATURATION: 98 % | RESPIRATION RATE: 22 BRPM | SYSTOLIC BLOOD PRESSURE: 168 MMHG | DIASTOLIC BLOOD PRESSURE: 85 MMHG

## 2021-09-08 PROCEDURE — 3700000000 HC ANESTHESIA ATTENDED CARE: Performed by: INTERNAL MEDICINE

## 2021-09-08 PROCEDURE — 3609027000 HC COLONOSCOPY: Performed by: INTERNAL MEDICINE

## 2021-09-08 PROCEDURE — 2709999900 HC NON-CHARGEABLE SUPPLY: Performed by: INTERNAL MEDICINE

## 2021-09-08 PROCEDURE — 7100000011 HC PHASE II RECOVERY - ADDTL 15 MIN: Performed by: INTERNAL MEDICINE

## 2021-09-08 PROCEDURE — 3700000001 HC ADD 15 MINUTES (ANESTHESIA): Performed by: INTERNAL MEDICINE

## 2021-09-08 PROCEDURE — 6360000002 HC RX W HCPCS: Performed by: NURSE ANESTHETIST, CERTIFIED REGISTERED

## 2021-09-08 PROCEDURE — 7100000010 HC PHASE II RECOVERY - FIRST 15 MIN: Performed by: INTERNAL MEDICINE

## 2021-09-08 PROCEDURE — 7100000000 HC PACU RECOVERY - FIRST 15 MIN: Performed by: INTERNAL MEDICINE

## 2021-09-08 PROCEDURE — 2500000003 HC RX 250 WO HCPCS: Performed by: NURSE ANESTHETIST, CERTIFIED REGISTERED

## 2021-09-08 PROCEDURE — 2580000003 HC RX 258: Performed by: ANESTHESIOLOGY

## 2021-09-08 PROCEDURE — 7100000001 HC PACU RECOVERY - ADDTL 15 MIN: Performed by: INTERNAL MEDICINE

## 2021-09-08 RX ORDER — LIDOCAINE HYDROCHLORIDE 20 MG/ML
INJECTION, SOLUTION EPIDURAL; INFILTRATION; INTRACAUDAL; PERINEURAL PRN
Status: DISCONTINUED | OUTPATIENT
Start: 2021-09-08 | End: 2021-09-08 | Stop reason: SDUPTHER

## 2021-09-08 RX ORDER — SODIUM CHLORIDE 0.9 % (FLUSH) 0.9 %
5-40 SYRINGE (ML) INJECTION PRN
Status: DISCONTINUED | OUTPATIENT
Start: 2021-09-08 | End: 2021-09-08 | Stop reason: HOSPADM

## 2021-09-08 RX ORDER — SODIUM CHLORIDE 9 MG/ML
INJECTION, SOLUTION INTRAVENOUS CONTINUOUS
Status: DISCONTINUED | OUTPATIENT
Start: 2021-09-08 | End: 2021-09-08 | Stop reason: HOSPADM

## 2021-09-08 RX ORDER — SODIUM CHLORIDE 0.9 % (FLUSH) 0.9 %
5-40 SYRINGE (ML) INJECTION EVERY 12 HOURS SCHEDULED
Status: DISCONTINUED | OUTPATIENT
Start: 2021-09-08 | End: 2021-09-08 | Stop reason: HOSPADM

## 2021-09-08 RX ORDER — SODIUM CHLORIDE 9 MG/ML
25 INJECTION, SOLUTION INTRAVENOUS PRN
Status: DISCONTINUED | OUTPATIENT
Start: 2021-09-08 | End: 2021-09-08 | Stop reason: HOSPADM

## 2021-09-08 RX ORDER — PROPOFOL 10 MG/ML
INJECTION, EMULSION INTRAVENOUS CONTINUOUS PRN
Status: DISCONTINUED | OUTPATIENT
Start: 2021-09-08 | End: 2021-09-08 | Stop reason: SDUPTHER

## 2021-09-08 RX ADMIN — LIDOCAINE HYDROCHLORIDE 50 MG: 20 INJECTION, SOLUTION EPIDURAL; INFILTRATION; INTRACAUDAL; PERINEURAL at 09:18

## 2021-09-08 RX ADMIN — SODIUM CHLORIDE: 9 INJECTION, SOLUTION INTRAVENOUS at 07:59

## 2021-09-08 RX ADMIN — PROPOFOL 200 MCG/KG/MIN: 10 INJECTION, EMULSION INTRAVENOUS at 09:18

## 2021-09-08 ASSESSMENT — PULMONARY FUNCTION TESTS
PIF_VALUE: 0

## 2021-09-08 ASSESSMENT — PAIN SCALES - GENERAL
PAINLEVEL_OUTOF10: 0

## 2021-09-08 ASSESSMENT — LIFESTYLE VARIABLES: SMOKING_STATUS: 0

## 2021-09-08 ASSESSMENT — PAIN - FUNCTIONAL ASSESSMENT: PAIN_FUNCTIONAL_ASSESSMENT: 0-10

## 2021-09-08 ASSESSMENT — PAIN DESCRIPTION - DESCRIPTORS: DESCRIPTORS: ACHING

## 2021-09-08 NOTE — PROGRESS NOTES
To pacu from endo. Pt asleep. Wakes briefly. Denies pain. Abd soft. IV infusing. Monitor in sinus rhythm.

## 2021-09-08 NOTE — ANESTHESIA PRE PROCEDURE
[facility] Department of Anesthesiology  Pre-Anesthesia Evaluation/Consultation       Name:  Gillian Lozoya  : 1950  Age:  66 y.o. MRN:  [id number]  Date: 2021           Surgeon: Surgeon(s):  Ulyess Gosselin, MD    Procedure: Procedure(s):  COLONOSCOPY     Allergies   Allergen Reactions    Other      splenda     Patient Active Problem List   Diagnosis    Dizziness     Past Medical History:   Diagnosis Date    Neck pain     Rheumatic carditis     Rheumatic fever     Thyroid disease      Past Surgical History:   Procedure Laterality Date    APPENDECTOMY      CATARACT REMOVAL WITH IMPLANT Left 09/10/2018    Dr. Eugenia Hayward Right 2018    COLONOSCOPY      HYSTERECTOMY      NECK SURGERY      X 4    PARATHYROIDECTOMY      NC XCAPSL CTRC RMVL INSJ IO LENS PROSTH W/O ECP Right 2018    PHACOEMULSIFICATION WITH INTRAOCULAR LENS performed by America Vicente MD at [address] Bilateral     THYROIDECTOMY       Social History     Tobacco Use    Smoking status: Never Smoker    Smokeless tobacco: Never Used   Vaping Use    Vaping Use: Never used   Substance Use Topics    Alcohol use: Yes     Comment: rare    Drug use: No     Medications  No current facility-administered medications on file prior to encounter. Current Outpatient Medications on File Prior to Encounter   Medication Sig Dispense Refill    oxyCODONE (ROXICODONE) 5 MG immediate release tablet Take 5 mg by mouth every 8 hours as needed for Pain.       aspirin 81 MG tablet Take 81 mg by mouth every evening       calcium carbonate (OSCAL) 500 MG TABS tablet Take 500 mg by mouth daily      Multiple Vitamin (ONE-A-DAY ESSENTIAL) TABS Take 1 tablet by mouth daily      vitamin D3 (CHOLECALCIFEROL) 25 MCG (1000 UT) TABS tablet Take 2,000 Units by mouth every evening       vitamin B-12 (CYANOCOBALAMIN) 100 MCG tablet Take 50 mcg by mouth daily      L-Methylfolate-Algae-B12-B6 (METANX PO) Take 1 tablet by mouth daily      folic acid (FOLVITE) 1 MG tablet Take 1 mg by mouth every evening       levothyroxine (SYNTHROID) 112 MCG tablet Take 112 mcg by mouth Daily       Current Facility-Administered Medications   Medication Dose Route Frequency Provider Last Rate Last Admin    0.9 % sodium chloride infusion   IntraVENous Continuous Bennett Tabor MD        sodium chloride flush 0.9 % injection 5-40 mL  5-40 mL IntraVENous 2 times per day Bennett Tabor MD        sodium chloride flush 0.9 % injection 5-40 mL  5-40 mL IntraVENous PRN Bennett Tabor MD        0.9 % sodium chloride infusion  25 mL IntraVENous PRN Bennett Tabor MD         Vital Signs (Current)   Vitals:    09/07/21 1231   Weight: 151 lb (68.5 kg)   Height: 5' 3\" (1.6 m)                                            Vital Signs Statistics (for past 48 hrs)     No data recorded  BP Readings from Last 3 Encounters:   05/27/21 (!) 144/84   04/24/21 (!) 144/79   09/24/18 (!) 144/78       BMI  Body mass index is 26.75 kg/m². Estimated body mass index is 26.75 kg/m² as calculated from the following:    Height as of this encounter: 5' 3\" (1.6 m). Weight as of this encounter: 151 lb (68.5 kg).     CBC   Lab Results   Component Value Date    WBC 7.1 04/24/2021    RBC 4.01 04/24/2021    HGB 12.6 04/24/2021    HCT 37.6 04/24/2021    MCV 93.8 04/24/2021    RDW 13.5 04/24/2021     04/24/2021     CMP    Lab Results   Component Value Date     04/24/2021    K 3.8 04/24/2021    K 4.1 04/22/2021     04/24/2021    CO2 25 04/24/2021    BUN 10 04/24/2021    CREATININE 0.5 04/24/2021    GFRAA >60 04/24/2021    AGRATIO 1.4 04/22/2021    LABGLOM >60 04/24/2021    GLUCOSE 127 04/24/2021    PROT 6.9 04/22/2021    CALCIUM 8.9 04/24/2021    BILITOT 0.3 04/22/2021    ALKPHOS 84 04/22/2021    AST 18 04/22/2021    ALT 8 04/22/2021     BMP    Lab Results   Component Value Date     04/24/2021    K 3.8 04/24/2021    K 4.1 04/22/2021     04/24/2021    CO2 25 04/24/2021    BUN 10 04/24/2021    CREATININE 0.5 04/24/2021    CALCIUM 8.9 04/24/2021    GFRAA >60 04/24/2021    LABGLOM >60 04/24/2021    GLUCOSE 127 04/24/2021     POCGlucose  No results for input(s): GLUCOSE in the last 72 hours. [facility]    Lab Results   Component Value Date    PROTIME 11.6 04/22/2021    INR 1.00 76/86/0221     HCG (If Applicable) No results found for: PREGTESTUR, PREGSERUM, HCG, HCGQUANT   ABGs No results found for: PHART, PO2ART, MBI4WEQ, UZI1SXJ, BEART, H5MSCQDH   Type & Screen (If Applicable)  No results found for: LABABO, LABRH                         BMI: Wt Readings from Last 3 Encounters:       NPO Status:  >8h solids, >2h clears                            Anesthesia Evaluation  Patient summary reviewed no history of anesthetic complications:   Airway: Mallampati: II  TM distance: >3 FB   Neck ROM: full  Mouth opening: > = 3 FB Dental:      Comment: No loose teeth, missing molars    Pulmonary: breath sounds clear to auscultation      (-) COPD, asthma, sleep apnea and not a current smoker                           Cardiovascular:  Exercise tolerance: good (>4 METS),       (-) hypertension, past MI, CABG/stent and no hyperlipidemia        Rate: normal                    Neuro/Psych:      (-) seizures, TIA and CVA           GI/Hepatic/Renal:        (-) GERD       Endo/Other:    (+) hypothyroidism::., .                 Abdominal:             Vascular:     - DVT and PE. Other Findings:           Anesthesia Plan      MAC     ASA 2       Induction: intravenous. Anesthetic plan and risks discussed with patient. Plan discussed with CRNA. This pre-anesthesia assessment may be used as a history and physical.    DOS STAFF ADDENDUM:    Pt seen and examined, chart reviewed (including anesthesia, drug and allergy history).   No interval changes to history and physical examination. Anesthetic plan, risks, benefits, alternatives, and personnel involved discussed with patient. Patient verbalized an understanding and agrees to proceed.       Merlinda Pollen, MD  September 8, 2021  7:48 AM no ear pain/no tinnitus/no sinus symptoms/no dysphagia/no hearing difficulty/no post-nasal discharge/no gum bleeding/no nose bleeds

## 2021-09-08 NOTE — ANESTHESIA POSTPROCEDURE EVALUATION
Department of Anesthesiology  Postprocedure Note    Patient: Liz Coello  MRN: [id number]  YOB: 1950  Date of evaluation: 9/8/2021  Time:  11:31 AM     Procedure Summary     Date: 09/08/21 Room / Location: [address] [place] [address]    Anesthesia Start: 0914 Anesthesia Stop: 0929    Procedure: COLONOSCOPY (N/A ) Diagnosis:       Family history of colon cancer      (HISTORY OF POLYPS, FAMILY HISTORY OF COLON CANCER)    Surgeons: Anselmo Murphy MD Responsible Provider: Rochelle Vang MD    Anesthesia Type: MAC ASA Status: 2          Anesthesia Type: MAC    Gustavo Phase I: Gustavo Score: 10    Gustavo Phase II: Gustavo Score: 10    Last vitals: Reviewed and per EMR flowsheets.        Anesthesia Post Evaluation    Patient location during evaluation: PACU  Patient participation: complete - patient participated  Level of consciousness: awake and alert  Pain score: 0  Airway patency: patent  Nausea & Vomiting: no nausea and no vomiting  Complications: no  Cardiovascular status: blood pressure returned to baseline  Respiratory status: acceptable  Hydration status: euvolemic

## 2021-09-08 NOTE — PROCEDURES
[facility]  Endoscopy Note    Patient: Benedetto Oppenheim  : 1950  Acct#: [de-identified]    Procedure: Colonoscopy     Date:  2021    Surgeon:  Justine Bonner MD, MD    Referring Physician:  Robert Ryan    Previous Colonoscopy: Yes  Date: 6/4/15  Greater than 3 years? Yes    Preoperative Diagnosis:  surveillance    Postoperative Diagnosis:  Normal colon    Anesthesia:  See anesthesia note    Indications: This is a 66y.o. year old female who presents today with surveillance. Procedure: An informed consent was obtained from the patient after explanation of indications, benefits, possible risks and complications of the procedure. The patient was then taken to the endoscopy suite, placed in the left lateral decubitus position, and the above IV anesthesia was administered. A digital rectal examination was performed and revealed negative without mass, lesions or tenderness. The Olympus CFQ-180-AL video colonoscope was placed in the patient's rectum under digital direction and advanced to the cecum. The cecum was identified by characteristic anatomy and ballottment. The ileocecal valve was identified. The preparation was good. The scope was then withdrawn back through the cecum, ascending, transverse, descending and sigmoid colons. Carefull circumferential examination of the mucosa in these areas demonstrated normal colonic mucosa throughout. There is diverticulosis in the sigmoid colon. The scope was then withdrawn into the rectum and retroflexed. The retroflexed view of the anal verge and rectum demonstrates no abnormalities. The scope was straightened, the colon was decompressed and the scope was withdrawn from the patient. The patient tolerated the procedure well and was taken to the PACU in good condition. Estimated Blood Loss:  none    Impression: Normal colon    Recommendations:  Repeat colonoscopy in 10 years.     Justine Bonner MD, MD   [facility]  2021

## 2021-09-08 NOTE — H&P
[facility]   Pre-operative History and Physical    Patient: Jean-Cladue Carrion  : 1950  Acct#: [de-identified]    History Obtained From: electronic medical record    HISTORY OF PRESENT ILLNESS  Procedure:Colonoscopy  Indications:surveillance  Past Medical History:        Diagnosis Date    Neck pain     Rheumatic carditis     Rheumatic fever     Thyroid disease      Past Surgical History:        Procedure Laterality Date    APPENDECTOMY      CATARACT REMOVAL WITH IMPLANT Left 09/10/2018    Dr. Suzan Desai Right 2018    COLONOSCOPY      HYSTERECTOMY      NECK SURGERY      X 4    PARATHYROIDECTOMY      CA XCAPSL CTRC RMVL INSJ IO LENS PROSTH W/O ECP Right 2018    PHACOEMULSIFICATION WITH INTRAOCULAR LENS performed by Jannie Rascon MD at [facility] Bilateral     THYROIDECTOMY       Medications prior to admission:   Prior to Admission medications    Medication Sig Start Date End Date Taking? Authorizing Provider   oxyCODONE (ROXICODONE) 5 MG immediate release tablet Take 5 mg by mouth every 8 hours as needed for Pain.    Yes Historical Provider, MD   aspirin 81 MG tablet Take 81 mg by mouth every evening    Yes Historical Provider, MD   calcium carbonate (OSCAL) 500 MG TABS tablet Take 500 mg by mouth daily   Yes Historical Provider, MD   Multiple Vitamin (ONE-A-DAY ESSENTIAL) TABS Take 1 tablet by mouth daily   Yes Historical Provider, MD   vitamin D3 (CHOLECALCIFEROL) 25 MCG (1000 UT) TABS tablet Take 2,000 Units by mouth every evening    Yes Historical Provider, MD   vitamin B-12 (CYANOCOBALAMIN) 100 MCG tablet Take 50 mcg by mouth daily   Yes Historical Provider, MD   L-Methylfolate-Algae-B12-B6 (METANX PO) Take 1 tablet by mouth daily   Yes Historical Provider, MD   folic acid (FOLVITE) 1 MG tablet Take 1 mg by mouth every evening    Yes Historical Provider, MD   levothyroxine (SYNTHROID) 112 MCG tablet Take 112 mcg by expresses understanding.
